# Patient Record
Sex: FEMALE | Race: WHITE | NOT HISPANIC OR LATINO | Employment: FULL TIME | URBAN - NONMETROPOLITAN AREA
[De-identification: names, ages, dates, MRNs, and addresses within clinical notes are randomized per-mention and may not be internally consistent; named-entity substitution may affect disease eponyms.]

---

## 2017-08-09 LAB — HIV 1&2 EXT: NORMAL

## 2018-03-04 ENCOUNTER — HOSPITAL ENCOUNTER (EMERGENCY)
Facility: HOSPITAL | Age: 38
Discharge: HOME | End: 2018-03-04
Attending: INTERNAL MEDICINE | Admitting: INTERNAL MEDICINE
Payer: COMMERCIAL

## 2018-03-04 VITALS
SYSTOLIC BLOOD PRESSURE: 121 MMHG | HEIGHT: 68 IN | OXYGEN SATURATION: 100 % | DIASTOLIC BLOOD PRESSURE: 81 MMHG | RESPIRATION RATE: 16 BRPM | TEMPERATURE: 98.5 F

## 2018-03-04 DIAGNOSIS — R53.83 FATIGUE, UNSPECIFIED TYPE: ICD-10-CM

## 2018-03-04 LAB
ALBUMIN SERPL-MCNC: 3.4 G/DL (ref 3.4–5)
ALBUMIN UR-MCNC: NEGATIVE MG/DL
ALP SERPL-CCNC: 178 U/L (ref 40–150)
ALT SERPL W P-5'-P-CCNC: 32 U/L (ref 0–50)
ANION GAP SERPL CALCULATED.3IONS-SCNC: 6 MMOL/L (ref 3–14)
APPEARANCE UR: CLEAR
AST SERPL W P-5'-P-CCNC: 31 U/L (ref 0–45)
BACTERIA #/AREA URNS HPF: ABNORMAL /HPF
BASOPHILS # BLD AUTO: 0.1 10E9/L (ref 0–0.2)
BASOPHILS NFR BLD AUTO: 0.5 %
BILIRUB SERPL-MCNC: 0.3 MG/DL (ref 0.2–1.3)
BILIRUB UR QL STRIP: NEGATIVE
BUN SERPL-MCNC: 15 MG/DL (ref 7–30)
CALCIUM SERPL-MCNC: 9.9 MG/DL (ref 8.5–10.1)
CHLORIDE SERPL-SCNC: 106 MMOL/L (ref 94–109)
CO2 SERPL-SCNC: 26 MMOL/L (ref 20–32)
COLOR UR AUTO: YELLOW
CREAT SERPL-MCNC: 0.71 MG/DL (ref 0.52–1.04)
DIFFERENTIAL METHOD BLD: ABNORMAL
EOSINOPHIL # BLD AUTO: 0.2 10E9/L (ref 0–0.7)
EOSINOPHIL NFR BLD AUTO: 2 %
ERYTHROCYTE [DISTWIDTH] IN BLOOD BY AUTOMATED COUNT: 15.9 % (ref 10–15)
GFR SERPL CREATININE-BSD FRML MDRD: >90 ML/MIN/1.7M2
GLUCOSE SERPL-MCNC: 89 MG/DL (ref 70–99)
GLUCOSE UR STRIP-MCNC: NEGATIVE MG/DL
HCT VFR BLD AUTO: 37.6 % (ref 35–47)
HGB BLD-MCNC: 11.9 G/DL (ref 11.7–15.7)
HGB UR QL STRIP: ABNORMAL
IMM GRANULOCYTES # BLD: 0.1 10E9/L (ref 0–0.4)
IMM GRANULOCYTES NFR BLD: 0.5 %
KETONES UR STRIP-MCNC: NEGATIVE MG/DL
LEUKOCYTE ESTERASE UR QL STRIP: ABNORMAL
LYMPHOCYTES # BLD AUTO: 3 10E9/L (ref 0.8–5.3)
LYMPHOCYTES NFR BLD AUTO: 25.1 %
MCH RBC QN AUTO: 26.1 PG (ref 26.5–33)
MCHC RBC AUTO-ENTMCNC: 31.6 G/DL (ref 31.5–36.5)
MCV RBC AUTO: 83 FL (ref 78–100)
MONOCYTES # BLD AUTO: 0.9 10E9/L (ref 0–1.3)
MONOCYTES NFR BLD AUTO: 7.2 %
MUCOUS THREADS #/AREA URNS LPF: PRESENT /LPF
NEUTROPHILS # BLD AUTO: 7.7 10E9/L (ref 1.6–8.3)
NEUTROPHILS NFR BLD AUTO: 64.7 %
NITRATE UR QL: NEGATIVE
NRBC # BLD AUTO: 0 10*3/UL
NRBC BLD AUTO-RTO: 0 /100
PH UR STRIP: 5.5 PH (ref 4.7–8)
PLATELET # BLD AUTO: 465 10E9/L (ref 150–450)
POTASSIUM SERPL-SCNC: 3.7 MMOL/L (ref 3.4–5.3)
PROT SERPL-MCNC: 8.4 G/DL (ref 6.8–8.8)
RBC # BLD AUTO: 4.56 10E12/L (ref 3.8–5.2)
RBC #/AREA URNS AUTO: 38 /HPF (ref 0–2)
SODIUM SERPL-SCNC: 138 MMOL/L (ref 133–144)
SOURCE: ABNORMAL
SP GR UR STRIP: 1.02 (ref 1–1.03)
UROBILINOGEN UR STRIP-MCNC: NORMAL MG/DL (ref 0–2)
WBC # BLD AUTO: 11.9 10E9/L (ref 4–11)
WBC #/AREA URNS AUTO: 6 /HPF (ref 0–5)

## 2018-03-04 PROCEDURE — 81001 URINALYSIS AUTO W/SCOPE: CPT | Performed by: INTERNAL MEDICINE

## 2018-03-04 PROCEDURE — 36415 COLL VENOUS BLD VENIPUNCTURE: CPT | Performed by: INTERNAL MEDICINE

## 2018-03-04 PROCEDURE — 85025 COMPLETE CBC W/AUTO DIFF WBC: CPT | Performed by: INTERNAL MEDICINE

## 2018-03-04 PROCEDURE — 99283 EMERGENCY DEPT VISIT LOW MDM: CPT | Performed by: INTERNAL MEDICINE

## 2018-03-04 PROCEDURE — 80053 COMPREHEN METABOLIC PANEL: CPT | Performed by: INTERNAL MEDICINE

## 2018-03-04 PROCEDURE — 99283 EMERGENCY DEPT VISIT LOW MDM: CPT

## 2018-03-04 ASSESSMENT — ENCOUNTER SYMPTOMS
HEMATURIA: 0
CHILLS: 0
ABDOMINAL PAIN: 0
VOMITING: 0
ABDOMINAL DISTENTION: 0
NAUSEA: 0
CHEST TIGHTNESS: 0
MYALGIAS: 0
WHEEZING: 0
NECK STIFFNESS: 0
PALPITATIONS: 0
DYSURIA: 0
DIAPHORESIS: 0
BACK PAIN: 0
COUGH: 0
BLOOD IN STOOL: 0
COLOR CHANGE: 0
VOICE CHANGE: 0
FLANK PAIN: 0
ARTHRALGIAS: 0
WOUND: 0
SHORTNESS OF BREATH: 0
NECK PAIN: 0
FEVER: 0
LIGHT-HEADEDNESS: 0
HEADACHES: 0
NUMBNESS: 0
DIZZINESS: 0
CONFUSION: 0
ANAL BLEEDING: 0

## 2018-03-04 NOTE — ED AVS SNAPSHOT
[facility]    [address]    [place] [address]    Phone:  [phone]                                       Savanna Scott   MRN: [id number]    Department:  [facility]   Date of Visit:  3/4/2018           Patient Information     Date Of Birth          1980        Your diagnoses for this visit were:     Fatigue, unspecified type        You were seen by Francisco Parker MD.      Follow-up Information     Schedule an appointment as soon as possible for a visit with Anahi Pace MD.    Specialty:  Family Practice    Contact information:    [facility]  [address]  [place] [address]  [phone]          Discharge Instructions         Symptoms With Uncertain Cause (Adult)  You have been examined, and tests may have been done. However, the exact cause of your symptoms is still not certain. Watch for any new symptoms or worsening of your condition. Another exam or more testing at a later time may be needed. Unless told otherwise, you can go back to your normal routine.  Follow-up care  Follow up with your healthcare provider if your symptoms do not begin to improve in the next few days, or as advised by our staff.   When to seek medical advice  Call your healthcare provider if your symptoms get worse or if new symptoms appear.  Date Last Reviewed: 6/26/2015 2000-2017 [employer]. [address] [place] [address], [place] [address]. All rights reserved. This information is not intended as a substitute for professional medical care. Always follow your healthcare professional's instructions.             Review of your medicines      Notice     You have not been prescribed any medications.            Procedures and tests performed during your visit     CBC with platelets differential    Comprehensive metabolic panel    UA with Microscopic reflex to Culture      Orders Needing Specimen Collection     None      Pending Results     No orders found from 3/2/2018  "to 3/5/2018.            Pending Culture Results     No orders found from 3/2/2018 to 3/5/2018.            Thank you for choosing [facility]       Thank you for choosing [facility] for your care. Our goal is always to provide you with excellent care. Hearing back from our patients is one way we can continue to improve our services. Please take a few minutes to complete the written survey that you may receive in the mail after you visit with us. Thank you!        [employer] Information     [employer] lets you send messages to your doctor, view your test results, renew your prescriptions, schedule appointments and more. To sign up, go to [email or web address] . Click on \"Log in\" on the left side of the screen, which will take you to the Welcome page. Then click on \"Sign up Now\" on the right side of the page.     You will be asked to enter the access code listed below, as well as some personal information. Please follow the directions to create your username and password.     Your access code is: [email or web address]  Expires: 2018  9:31 PM     Your access code will  in 90 days. If you need help or a new code, please call your [facility] clinic or [phone].        Care EveryWhere ID     This is your Care EveryWhere ID. This could be used by other organizations to access your [facility] medical records  [id number]        Equal Access to Services     BERT HAMMOND AH: Hadsimno Santo, waaxda eren, qaybta kaaldelmi hernandez. So [facility] [phone].    ATENCIÓN: Si habla español, tiene a blood disposición servicios gratuitos de asistencia lingüística. Bogdan al [phone].    We comply with applicable federal civil rights laws and [place] laws. We do not discriminate on the basis of race, color, national origin, age, disability, sex, sexual orientation, or gender identity.            After Visit Summary       This is your record. Keep this with you and show to your " community pharmacist(s) and doctor(s) at your next visit.

## 2018-03-04 NOTE — ED AVS SNAPSHOT
[facility]    [address]    [place] [address]    Phone:  [phone]                                       Savanna Scott   MRN: [id number]    Department:  [facility]   Date of Visit:  3/4/2018           After Visit Summary Signature Page     I have received my discharge instructions, and my questions have been answered. I have discussed any challenges I see with this plan with the nurse or doctor.    ..........................................................................................................................................  Patient/Patient Representative Signature      ..........................................................................................................................................  Patient Representative Print Name and Relationship to Patient    ..................................................               ................................................  Date                                            Time    ..........................................................................................................................................  Reviewed by Signature/Title    ...................................................              ..............................................  Date                                                            Time

## 2018-03-05 NOTE — ED PROVIDER NOTES
"  History     Chief Complaint   Patient presents with     Generalized Weakness     Pt stated she had an uncomplicated vaginal delivery. States her hgb 9.8 at discharge. States she feels very tired \"more tired than I think I should\". Denies any fevers.     The history is provided by the patient.     Savanna Scott is a 37 year old female who came for feeling fatigue today. Denies any other symptoms althought had some slight change in her voice.     Problem List:    There are no active problems to display for this patient.       Past Medical History:    History reviewed. No pertinent past medical history.    Past Surgical History:    History reviewed. No pertinent surgical history.    Family History:    No family history on file.    Social History:  Marital Status:   [2]  Social History   Substance Use Topics     Smoking status: Never Smoker     Smokeless tobacco: Never Used     Alcohol use Not on file        Medications:      No current outpatient prescriptions on file.      Review of Systems   Constitutional: Negative for chills, diaphoresis and fever.   HENT: Negative for voice change.    Eyes: Negative for visual disturbance.   Respiratory: Negative for cough, chest tightness, shortness of breath and wheezing.    Cardiovascular: Negative for chest pain, palpitations and leg swelling.   Gastrointestinal: Negative for abdominal distention, abdominal pain, anal bleeding, blood in stool, nausea and vomiting.   Genitourinary: Negative for decreased urine volume, dysuria, flank pain and hematuria.   Musculoskeletal: Negative for arthralgias, back pain, gait problem, myalgias, neck pain and neck stiffness.   Skin: Negative for color change, pallor, rash and wound.   Neurological: Negative for dizziness, syncope, light-headedness, numbness and headaches.   Psychiatric/Behavioral: Negative for confusion and suicidal ideas.       Physical Exam   BP: 131/68  Heart Rate: 79  Temp: 97.6  F (36.4  C)  Resp: " "16  Height: 172.7 cm (5' 8\")  SpO2: 100 %      Physical Exam   Constitutional: She is oriented to person, place, and time. She appears well-developed and well-nourished.   HENT:   Head: Normocephalic and atraumatic.   Mouth/Throat: No oropharyngeal exudate.   Eyes: Conjunctivae are normal. Pupils are equal, round, and reactive to light.   Neck: Normal range of motion. Neck supple. No JVD present. No tracheal deviation present. No thyromegaly present.   Cardiovascular: Normal rate, regular rhythm, normal heart sounds and intact distal pulses.  Exam reveals no gallop and no friction rub.    No murmur heard.  Pulmonary/Chest: Effort normal and breath sounds normal. No stridor. No respiratory distress. She has no wheezes. She has no rales. She exhibits no tenderness.   Abdominal: Soft. Bowel sounds are normal. She exhibits no distension and no mass. There is no tenderness. There is no rebound and no guarding.   Musculoskeletal: Normal range of motion. She exhibits no edema or tenderness.   Lymphadenopathy:     She has no cervical adenopathy.   Neurological: She is alert and oriented to person, place, and time.   Skin: Skin is warm and dry. No rash noted. No erythema. No pallor.   Psychiatric: Her behavior is normal.   Nursing note and vitals reviewed.      ED Course     ED Course     Procedures                   Labs Ordered and Resulted from Time of ED Arrival Up to the Time of Departure from the ED   ROUTINE UA WITH MICROSCOPIC REFLEX TO CULTURE - Abnormal; Notable for the following:        Result Value    Blood Urine Large (*)     Leukocyte Esterase Urine Small (*)     WBC Urine 6 (*)     RBC Urine 38 (*)     Bacteria Urine Few (*)     Mucous Urine Present (*)     All other components within normal limits   CBC WITH PLATELETS DIFFERENTIAL - Abnormal; Notable for the following:     WBC 11.9 (*)     MCH 26.1 (*)     RDW 15.9 (*)     Platelet Count 465 (*)     All other components within normal limits   COMPREHENSIVE " METABOLIC PANEL - Abnormal; Notable for the following:     Alkaline Phosphatase 178 (*)     All other components within normal limits       Assessments & Plan (with Medical Decision Making)   Post partum, feeling fatigue that started today  Exam normal  Labs: slight elevated WBC   UA: likely contaminated with vag discharge  Denies any urinary symptoms  Uncertain etiology, can be early viral disease  Dc home, fu with PCP  I have reviewed the nursing notes.    I have reviewed the findings, diagnosis, plan and need for follow up with the patient.      There are no discharge medications for this patient.      Final diagnoses:   Fatigue, unspecified type       3/4/2018   [facility]     Francisco Parker MD  03/04/18 [id number]

## 2018-03-05 NOTE — DISCHARGE INSTRUCTIONS
Symptoms With Uncertain Cause (Adult)  You have been examined, and tests may have been done. However, the exact cause of your symptoms is still not certain. Watch for any new symptoms or worsening of your condition. Another exam or more testing at a later time may be needed. Unless told otherwise, you can go back to your normal routine.  Follow-up care  Follow up with your healthcare provider if your symptoms do not begin to improve in the next few days, or as advised by our staff.   When to seek medical advice  Call your healthcare provider if your symptoms get worse or if new symptoms appear.  Date Last Reviewed: 6/26/2015 2000-2017 [employer]. [address] [place] [address], [place] [address]. All rights reserved. This information is not intended as a substitute for professional medical care. Always follow your healthcare professional's instructions.

## 2018-03-05 NOTE — ED NOTES
"In ED for \"feeling run down and weak, just gave birth(vaginal) 6 days ago to a heathly boy, induced 1 week early due to gestational diabetes. Hemoglogin was low at 9.8 upon discharge from [facility] in [place], [place] this past Wed.  Traveled up here yesterday to visit family.   "

## 2020-06-05 LAB — HEP C HIM: NORMAL

## 2020-08-07 ENCOUNTER — NURSE TRIAGE (OUTPATIENT)
Dept: NURSING | Facility: CLINIC | Age: 40
End: 2020-08-07
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
## 2021-07-14 LAB — HPV ABSTRACT: NORMAL

## 2021-07-21 LAB
CHOLESTEROL (EXTERNAL): 189 MG/DL
HDLC SERPL-MCNC: 78 MG/DL
LDL CHOLESTEROL CALCULATED (EXTERNAL): 95 MG/DL
TRIGLYCERIDES (EXTERNAL): 80 MG/DL

## 2022-07-18 LAB
GLUCOSE (EXTERNAL): 98 MG/DL (ref 70–100)
HBA1C MFR BLD: 5 %
POTASSIUM (EXTERNAL): 3.8 MMOL/L (ref 3.5–5.1)
TSH SERPL-ACNC: 0.64 UIU/ML (ref 0.3–4.7)

## 2022-12-29 ENCOUNTER — TRANSFERRED RECORDS (OUTPATIENT)
Dept: MULTI SPECIALTY CLINIC | Facility: CLINIC | Age: 42
End: 2022-12-29

## 2022-12-29 LAB
ALT SERPL-CCNC: 15 U/L (ref 8–45)
AST SERPL-CCNC: 19 U/L (ref 5–41)
CREATININE (EXTERNAL): 0.77 MG/DL (ref 0.57–1.11)
GFR ESTIMATED (EXTERNAL): >60 ML/MIN/1.73M2

## 2023-05-19 ENCOUNTER — OFFICE VISIT (OUTPATIENT)
Dept: FAMILY MEDICINE | Facility: OTHER | Age: 43
End: 2023-05-19
Attending: FAMILY MEDICINE
Payer: COMMERCIAL

## 2023-05-19 VITALS
RESPIRATION RATE: 16 BRPM | TEMPERATURE: 97.7 F | HEART RATE: 75 BPM | HEIGHT: 68 IN | BODY MASS INDEX: 23.07 KG/M2 | DIASTOLIC BLOOD PRESSURE: 62 MMHG | OXYGEN SATURATION: 98 % | WEIGHT: 152.2 LBS | SYSTOLIC BLOOD PRESSURE: 104 MMHG

## 2023-05-19 DIAGNOSIS — F33.1 MODERATE EPISODE OF RECURRENT MAJOR DEPRESSIVE DISORDER (H): ICD-10-CM

## 2023-05-19 DIAGNOSIS — M25.50 ARTHRALGIA, UNSPECIFIED JOINT: ICD-10-CM

## 2023-05-19 DIAGNOSIS — Z00.00 HEALTH CARE MAINTENANCE: Primary | ICD-10-CM

## 2023-05-19 PROBLEM — R53.83 FATIGUE: Status: ACTIVE | Noted: 2023-05-19

## 2023-05-19 PROBLEM — H52.13 MYOPIA OF BOTH EYES WITH REGULAR ASTIGMATISM: Status: ACTIVE | Noted: 2021-03-04

## 2023-05-19 PROBLEM — F41.9 ANXIETY: Status: ACTIVE | Noted: 2020-06-24

## 2023-05-19 PROBLEM — H43.393 VITREOUS FLOATERS OF BOTH EYES: Status: ACTIVE | Noted: 2021-03-04

## 2023-05-19 PROBLEM — J30.1 SEASONAL ALLERGIC RHINITIS DUE TO POLLEN: Status: ACTIVE | Noted: 2019-05-29

## 2023-05-19 PROBLEM — H52.223 MYOPIA OF BOTH EYES WITH REGULAR ASTIGMATISM: Status: ACTIVE | Noted: 2021-03-04

## 2023-05-19 PROBLEM — H04.123 DRY EYE SYNDROME OF BOTH EYES: Status: ACTIVE | Noted: 2021-03-04

## 2023-05-19 PROBLEM — G43.109 CLASSIC MIGRAINE WITH AURA: Status: ACTIVE | Noted: 2020-01-14

## 2023-05-19 PROBLEM — K21.9 GASTROESOPHAGEAL REFLUX DISEASE WITHOUT ESOPHAGITIS: Status: ACTIVE | Noted: 2020-01-14

## 2023-05-19 PROCEDURE — 99386 PREV VISIT NEW AGE 40-64: CPT | Performed by: FAMILY MEDICINE

## 2023-05-19 RX ORDER — NAPROXEN SODIUM 220 MG
TABLET ORAL
COMMUNITY

## 2023-05-19 RX ORDER — VENLAFAXINE HYDROCHLORIDE 75 MG/1
75 CAPSULE, EXTENDED RELEASE ORAL DAILY
COMMUNITY
Start: 2023-04-03 | End: 2023-07-24

## 2023-05-19 ASSESSMENT — ENCOUNTER SYMPTOMS
HEADACHES: 0
CONSTIPATION: 0
SORE THROAT: 0
HEMATOCHEZIA: 0
SHORTNESS OF BREATH: 0
PALPITATIONS: 0
COUGH: 0
FREQUENCY: 0
DIARRHEA: 0
MYALGIAS: 0
ARTHRALGIAS: 0
DIZZINESS: 0
HEARTBURN: 0
DYSURIA: 0
PARESTHESIAS: 0
WEAKNESS: 0
HEMATURIA: 0
NERVOUS/ANXIOUS: 0
ABDOMINAL PAIN: 0
FEVER: 0
JOINT SWELLING: 0
CHILLS: 0
EYE PAIN: 0
NAUSEA: 0

## 2023-05-19 ASSESSMENT — ANXIETY QUESTIONNAIRES
2. NOT BEING ABLE TO STOP OR CONTROL WORRYING: NOT AT ALL
IF YOU CHECKED OFF ANY PROBLEMS ON THIS QUESTIONNAIRE, HOW DIFFICULT HAVE THESE PROBLEMS MADE IT FOR YOU TO DO YOUR WORK, TAKE CARE OF THINGS AT HOME, OR GET ALONG WITH OTHER PEOPLE: NOT DIFFICULT AT ALL
3. WORRYING TOO MUCH ABOUT DIFFERENT THINGS: SEVERAL DAYS
5. BEING SO RESTLESS THAT IT IS HARD TO SIT STILL: NOT AT ALL
4. TROUBLE RELAXING: NOT AT ALL
GAD7 TOTAL SCORE: 2
1. FEELING NERVOUS, ANXIOUS, OR ON EDGE: SEVERAL DAYS
7. FEELING AFRAID AS IF SOMETHING AWFUL MIGHT HAPPEN: NOT AT ALL
7. FEELING AFRAID AS IF SOMETHING AWFUL MIGHT HAPPEN: NOT AT ALL
6. BECOMING EASILY ANNOYED OR IRRITABLE: NOT AT ALL
8. IF YOU CHECKED OFF ANY PROBLEMS, HOW DIFFICULT HAVE THESE MADE IT FOR YOU TO DO YOUR WORK, TAKE CARE OF THINGS AT HOME, OR GET ALONG WITH OTHER PEOPLE?: NOT DIFFICULT AT ALL
GAD7 TOTAL SCORE: 2

## 2023-05-19 ASSESSMENT — PAIN SCALES - GENERAL: PAINLEVEL: NO PAIN (0)

## 2023-05-19 NOTE — PROGRESS NOTES
Assessment & Plan     No diagnosis found.         No follow-ups on file.    Lisa Reis MD  [facility] AND [facility]    Delroy Corralse is a 42 year old, presenting for the following health issues:  Physical (Yearly physical, establish care)        5/19/2023    10:03 AM   Additional Questions   Roomed by Gabriela   Accompanied by self     Healthy Habits:     Getting at least 3 servings of Calcium per day:  Yes    Bi-annual eye exam:  Yes    Dental care twice a year:  Yes    Sleep apnea or symptoms of sleep apnea:  None    Diet:  Regular (no restrictions)    Frequency of exercise:  1 day/week    Duration of exercise:  Less than 15 minutes    Taking medications regularly:  Yes    Medication side effects:  Other    PHQ-2 Total Score: 0    Additional concerns today:  No       Contraception: vasectomy  Risk for STI?: no  Last pap: 2021 with neg HPV  Any hx of abnormal paps:  no  FH of early CA?: no  Tobacco?: no  Calcium intake: yes  DEXA:  not indicated  Last mammo: no previous  Colonoscopy:  not indicated   Immunizations: up to date        Review of Systems   Constitutional: Negative for chills and fever.   HENT: Negative for congestion, ear pain, hearing loss and sore throat.    Eyes: Negative for pain and visual disturbance.   Respiratory: Negative for cough and shortness of breath.    Cardiovascular: Negative for chest pain, palpitations and peripheral edema.   Gastrointestinal: Negative for abdominal pain, constipation, diarrhea, heartburn, hematochezia and nausea.   Genitourinary: Negative for dysuria, frequency, genital sores, hematuria, pelvic pain, urgency and vaginal bleeding.   Musculoskeletal: Negative for arthralgias, joint swelling and myalgias.   Skin: Negative for rash.   Neurological: Negative for dizziness, weakness, headaches and paresthesias.   Psychiatric/Behavioral: Negative for mood changes. The patient is not nervous/anxious.                 Objective    /62 (BP Location:  "Right arm, Patient Position: Sitting, Cuff Size: Adult Regular)   Pulse 75   Temp 97.7  F (36.5  C) (Tympanic)   Resp 16   Ht 1.727 m (5' 8\")   Wt 69 kg (152 lb 3.2 oz)   LMP 05/14/2023 (Exact Date)   SpO2 98%   BMI 23.14 kg/m    Body mass index is 23.14 kg/m .  Physical Exam                       Answers for HPI/ROS submitted by the patient on 5/19/2023  KEVIN 7 TOTAL SCORE: 2      "

## 2023-05-19 NOTE — PROGRESS NOTES
Assessment & Plan       ICD-10-CM    1. Health care maintenance  Z00.00 MA Screen Bilateral w/[facility]      2. Moderate episode of recurrent major depressive disorder (H)  F33.1       3. Arthralgia, unspecified joint  M25.50         Reviewed healthcare maintenance recommendations.  Patient will proceed with mammogram for breast cancer screening.  Pap smear is up-to-date, next due in 2026.  Reviewed previous labs, none plan for repeat at this time.  Patient is encouraged to keep an appointment with her current rheumatologist as this will likely be easier than establishing with someone new.  Follow-up with concerns.         No follow-ups on file.    Lisa Reis MD  [facility]   Savanna is a 42 year old, presenting for the following health issues:  Physical (Yearly physical, establish care)        5/19/2023    10:03 AM   Additional Questions   Roomed by Gabriela   Accompanied by self     Healthy Habits:     Getting at least 3 servings of Calcium per day:  Yes    Bi-annual eye exam:  Yes    Dental care twice a year:  Yes    Sleep apnea or symptoms of sleep apnea:  None    Diet:  Regular (no restrictions)    Frequency of exercise:  1 day/week    Duration of exercise:  Less than 15 minutes    Taking medications regularly:  Yes    Medication side effects:  Other    PHQ-2 Total Score: 0    Additional concerns today:  No     Contraception: vasectomy  Risk for STI?: no  Last pap: 2021 with neg HPV  Any hx of abnormal paps:  no  FH of early CA?: no  Tobacco?: no  Calcium intake: yes  DEXA:  not indicated  Last mammo: no previous  Colonoscopy:  not indicated   Immunizations: up to date      Past medical history, past surgical history, family history, social history all reviewed and updated in the chart.    Review of Systems   Constitutional: Negative for chills and fever.   HENT: Negative for congestion, ear pain, hearing loss and sore throat.    Eyes: Negative for pain and visual disturbance.  "  Respiratory: Negative for cough and shortness of breath.    Cardiovascular: Negative for chest pain, palpitations and peripheral edema.   Gastrointestinal: Negative for abdominal pain, constipation, diarrhea, heartburn, hematochezia and nausea.   Genitourinary: Negative for dysuria, frequency, genital sores, hematuria, pelvic pain, urgency and vaginal bleeding.   Musculoskeletal: Negative for arthralgias, joint swelling and myalgias.   Skin: Negative for rash.   Neurological: Negative for dizziness, weakness, headaches and paresthesias.   Psychiatric/Behavioral: Negative for mood changes. The patient is not nervous/anxious.             Objective    /62 (BP Location: Right arm, Patient Position: Sitting, Cuff Size: Adult Regular)   Pulse 75   Temp 97.7  F (36.5  C) (Tympanic)   Resp 16   Ht 1.727 m (5' 8\")   Wt 69 kg (152 lb 3.2 oz)   LMP 05/14/2023 (Exact Date)   SpO2 98%   BMI 23.14 kg/m    Body mass index is 23.14 kg/m .  Physical Exam  Constitutional:       Appearance: She is well-developed.   Eyes:      Conjunctiva/sclera: Conjunctivae normal.   Neck:      Thyroid: No thyromegaly.   Cardiovascular:      Rate and Rhythm: Normal rate and regular rhythm.      Heart sounds: Normal heart sounds. No murmur heard.  Pulmonary:      Effort: Pulmonary effort is normal. No respiratory distress.      Breath sounds: Normal breath sounds.   Abdominal:      Palpations: Abdomen is soft.   Lymphadenopathy:      Cervical: No cervical adenopathy.   Skin:     Findings: No rash.   Neurological:      Mental Status: She is alert and oriented to person, place, and time.                         "

## 2023-05-19 NOTE — NURSING NOTE
"Chief Complaint   Patient presents with     Physical     Yearly physical, establish care       Initial /62 (BP Location: Right arm, Patient Position: Sitting, Cuff Size: Adult Regular)   Pulse 75   Temp 97.7  F (36.5  C) (Tympanic)   Resp 16   Ht 1.727 m (5' 8\")   Wt 69 kg (152 lb 3.2 oz)   LMP 05/14/2023 (Exact Date)   SpO2 98%   BMI 23.14 kg/m   Estimated body mass index is 23.14 kg/m  as calculated from the following:    Height as of this encounter: 1.727 m (5' 8\").    Weight as of this encounter: 69 kg (152 lb 3.2 oz).  Medication Reconciliation: complete    Gabriela Martinez LPN    Advance Care Directive reviewed    "

## 2023-07-23 ENCOUNTER — MYC MEDICAL ADVICE (OUTPATIENT)
Dept: FAMILY MEDICINE | Facility: OTHER | Age: 43
End: 2023-07-23
Payer: COMMERCIAL

## 2023-07-23 DIAGNOSIS — F33.1 MODERATE EPISODE OF RECURRENT MAJOR DEPRESSIVE DISORDER (H): Primary | ICD-10-CM

## 2023-07-24 RX ORDER — VENLAFAXINE HYDROCHLORIDE 75 MG/1
75 CAPSULE, EXTENDED RELEASE ORAL DAILY
Qty: 90 CAPSULE | Refills: 3 | Status: SHIPPED | OUTPATIENT
Start: 2023-07-24 | End: 2024-06-21

## 2023-10-06 ENCOUNTER — HOSPITAL ENCOUNTER (OUTPATIENT)
Dept: MAMMOGRAPHY | Facility: OTHER | Age: 43
Discharge: HOME | End: 2023-10-06
Attending: FAMILY MEDICINE | Admitting: FAMILY MEDICINE
Payer: COMMERCIAL

## 2023-10-06 DIAGNOSIS — Z00.00 HEALTH CARE MAINTENANCE: ICD-10-CM

## 2023-10-06 DIAGNOSIS — Z12.31 VISIT FOR SCREENING MAMMOGRAM: ICD-10-CM

## 2023-10-06 PROCEDURE — 77067 SCR MAMMO BI INCL CAD: CPT

## 2023-11-11 ENCOUNTER — HOSPITAL ENCOUNTER (EMERGENCY)
Facility: HOSPITAL | Age: 43
Discharge: HOME | End: 2023-11-11
Attending: PHYSICIAN ASSISTANT | Admitting: PHYSICIAN ASSISTANT
Payer: COMMERCIAL

## 2023-11-11 VITALS
TEMPERATURE: 96.6 F | HEIGHT: 68 IN | OXYGEN SATURATION: 97 % | DIASTOLIC BLOOD PRESSURE: 73 MMHG | RESPIRATION RATE: 18 BRPM | SYSTOLIC BLOOD PRESSURE: 112 MMHG | HEART RATE: 98 BPM | BODY MASS INDEX: 23.49 KG/M2 | WEIGHT: 155 LBS

## 2023-11-11 DIAGNOSIS — H66.92 LEFT ACUTE OTITIS MEDIA: ICD-10-CM

## 2023-11-11 PROCEDURE — G0463 HOSPITAL OUTPT CLINIC VISIT: HCPCS

## 2023-11-11 PROCEDURE — 99213 OFFICE O/P EST LOW 20 MIN: CPT | Performed by: PHYSICIAN ASSISTANT

## 2023-11-11 RX ORDER — PREDNISONE 20 MG/1
TABLET ORAL
Qty: 10 TABLET | Refills: 0 | Status: SHIPPED | OUTPATIENT
Start: 2023-11-11 | End: 2024-07-16

## 2023-11-11 RX ORDER — CEFDINIR 300 MG/1
300 CAPSULE ORAL 2 TIMES DAILY
Qty: 20 CAPSULE | Refills: 0 | Status: SHIPPED | OUTPATIENT
Start: 2023-11-11 | End: 2023-11-21

## 2023-11-11 NOTE — ED TRIAGE NOTES
Patient came to urgent care due to  left ear pain, started last night. Patient was holding her nose to clear her ears out but then her ears started hurting all the way to glands bilateral. Patient sated when she talks her left ear makes an echo sound like a keke.

## 2023-11-11 NOTE — DISCHARGE INSTRUCTIONS
Take the Cefdinir as prescribed for your ear infection.   Take the prednisone as prescribed to help with the pain and inflammation given your planned flight this week.   Alternate between Ibuprofen and Tylenol every 4 hours for fever/pain control.  Increase fluids and rest.  Use a humidifier at night.  Return here with any difficulty breathing or new/concerning symptoms.

## 2023-11-11 NOTE — ED PROVIDER NOTES
History     Chief Complaint   Patient presents with    Ear Fullness     HPI  Savanna Scott is a 43 year old female who is covid positive with URI symptoms beginning 1 week ago who presents today with severe right ear pain, fullness, and decreased hearing. H/o frequent sinus infections. She has a work flight planned this Wednesday and is requesting prednisone to help with relieving the congestion in her ear, as it has worked well in the past for her sinus infections.     Allergies:  Allergies   Allergen Reactions    Insulin Aspart Rash     Allergy to suspension not medication    Sulfa Antibiotics Rash     RASH    Amoxicillin-Pot Clavulanate Diarrhea    Insulin Lispro Unknown     Allergy to the suspension not the medication       Problem List:    Patient Active Problem List    Diagnosis Date Noted    Fatigue 05/19/2023     Priority: Medium    Dry eye syndrome of both eyes 03/04/2021     Priority: Medium    Myopia of both eyes with regular astigmatism 03/04/2021     Priority: Medium    Vitreous floaters of both eyes 03/04/2021     Priority: Medium    Anxiety 06/24/2020     Priority: Medium    Classic migraine with aura 01/14/2020     Priority: Medium    Gastroesophageal reflux disease without esophagitis 01/14/2020     Priority: Medium    Seasonal allergic rhinitis due to pollen 05/29/2019     Priority: Medium    Moderate episode of recurrent major depressive disorder (H) 04/29/2019     Priority: Medium    Bee sting allergy 07/15/2014     Priority: Medium        Past Medical History:    Past Medical History:   Diagnosis Date    Anxiety     Classic migraine with aura     Elevated rheumatoid factor 10/2015    Fatigue     Gastroesophageal reflux disease with esophagitis     Gestational diabetes mellitus, antepartum 02/23/2018    Hypermobility of joint     Seasonal allergic rhinitis     Vitamin D deficiency 08/19/2014    Vocal cord paralysis 05/25/2018       Past Surgical History:    Past Surgical History:  "  Procedure Laterality Date    COLONOSCOPY  01/05/2010    EGD  02/15/2010    with pill cam/patency placement    LAPAROSCOPY DIAGNOSTIC (GENERAL)      UPPER GI ENDOSCOPY  05/11/2021    with biospy, throat    wisdom teeth extraction         Family History:    Family History   Problem Relation Age of Onset    Osteoporosis Mother     Other - See Comments Mother         Familial hypocalcuric hypercalcemia    Hypothyroidism Mother     Rheumatic fever Father     No Known Problems Brother     Heart Disease Maternal Grandmother     Glaucoma Maternal Grandfather     Prostate Cancer Maternal Grandfather     Lung Cancer Paternal Grandmother     Cancer Paternal Grandfather         Bladder    Diabetes Paternal Grandfather     Glaucoma Paternal Grandfather     Cervical Cancer Maternal Aunt     Rheumatoid Arthritis Maternal Aunt     No Known Problems Son        Social History:  Marital Status:   [2]  Social History     Tobacco Use    Smoking status: Never    Smokeless tobacco: Never   Vaping Use    Vaping Use: Never used        Medications:    cefdinir (OMNICEF) 300 MG capsule  cholecalciferol 50 MCG (2000 UT) CAPS  Multiple Vitamin (MULTIVITAMIN ADULT PO)  naproxen sodium (ANAPROX) 220 MG tablet  predniSONE (DELTASONE) 20 MG tablet  venlafaxine (EFFEXOR XR) 75 MG 24 hr capsule          Review of Systems   All other systems reviewed and are negative.      Physical Exam   BP: 112/73  Pulse: 98  Temp: (!) 96.6  F (35.9  C)  Resp: 18  Height: 172.7 cm (5' 8\")  Weight: 70.3 kg (155 lb)  SpO2: 97 %      Physical Exam  Vitals and nursing note reviewed.   Constitutional:       General: She is not in acute distress.     Appearance: She is well-developed. She is not diaphoretic.   HENT:      Head: Normocephalic and atraumatic.      Right Ear: Ear canal and external ear normal. Decreased hearing noted. No mastoid tenderness. Tympanic membrane is erythematous and bulging.      Left Ear: Hearing, tympanic membrane, ear canal and " external ear normal. No mastoid tenderness.      Nose: Nose normal.      Mouth/Throat:      Pharynx: No oropharyngeal exudate.   Eyes:      General: No scleral icterus.        Right eye: No discharge.         Left eye: No discharge.      Conjunctiva/sclera: Conjunctivae normal.      Pupils: Pupils are equal, round, and reactive to light.   Cardiovascular:      Rate and Rhythm: Normal rate and regular rhythm.      Heart sounds: Normal heart sounds. No murmur heard.     No friction rub. No gallop.   Pulmonary:      Effort: Pulmonary effort is normal. No respiratory distress.      Breath sounds: Normal breath sounds. No wheezing or rales.   Chest:      Chest wall: No tenderness.   Abdominal:      General: Bowel sounds are normal.      Palpations: Abdomen is soft.      Tenderness: There is no abdominal tenderness.   Musculoskeletal:      Cervical back: Normal range of motion and neck supple.   Lymphadenopathy:      Cervical: No cervical adenopathy.   Skin:     General: Skin is warm and dry.      Coloration: Skin is not pale.      Findings: No erythema or rash.   Neurological:      Mental Status: She is alert and oriented to person, place, and time.      Cranial Nerves: No cranial nerve deficit.      Coordination: Coordination normal.   Psychiatric:         Behavior: Behavior normal.         Thought Content: Thought content normal.         Judgment: Judgment normal.         ED Course                 Procedures           No results found for this or any previous visit (from the past 24 hour(s)).    Medications - No data to display    Assessments & Plan (with Medical Decision Making)   Pt with bulging, erythematous, right TM, consistent with AOM. RX for Cefdinir was provided and prednisone per pt request given upcoming flight in 5 days. She was discharged home following in stable condition.     Plan: Take the Cefdinir as prescribed for your ear infection.   Take the prednisone as prescribed to help with the pain and  inflammation given your planned flight this week.   Alternate between Ibuprofen and Tylenol every 4 hours for fever/pain control.  Increase fluids and rest.  Use a humidifier at night.  Return here with any difficulty breathing or new/concerning symptoms.         I have reviewed the nursing notes.    I have reviewed the findings, diagnosis, plan and need for follow up with the patient.      New Prescriptions    CEFDINIR (OMNICEF) 300 MG CAPSULE    Take 1 capsule (300 mg) by mouth 2 times daily for 10 days    PREDNISONE (DELTASONE) 20 MG TABLET    Take two tablets (= 40mg) each day for 5 (five) days       Final diagnoses:   Left acute otitis media       11/11/2023   [facility]

## 2023-11-21 ENCOUNTER — MYC MEDICAL ADVICE (OUTPATIENT)
Dept: FAMILY MEDICINE | Facility: OTHER | Age: 43
End: 2023-11-21
Payer: COMMERCIAL

## 2023-11-21 DIAGNOSIS — R19.7 DIARRHEA: Primary | ICD-10-CM

## 2024-04-29 ENCOUNTER — MYC MEDICAL ADVICE (OUTPATIENT)
Dept: FAMILY MEDICINE | Facility: OTHER | Age: 44
End: 2024-04-29
Payer: COMMERCIAL

## 2024-06-18 SDOH — HEALTH STABILITY: PHYSICAL HEALTH: ON AVERAGE, HOW MANY DAYS PER WEEK DO YOU ENGAGE IN MODERATE TO STRENUOUS EXERCISE (LIKE A BRISK WALK)?: 5 DAYS

## 2024-06-18 SDOH — HEALTH STABILITY: PHYSICAL HEALTH: ON AVERAGE, HOW MANY MINUTES DO YOU ENGAGE IN EXERCISE AT THIS LEVEL?: 20 MIN

## 2024-06-18 ASSESSMENT — ANXIETY QUESTIONNAIRES
7. FEELING AFRAID AS IF SOMETHING AWFUL MIGHT HAPPEN: NOT AT ALL
7. FEELING AFRAID AS IF SOMETHING AWFUL MIGHT HAPPEN: NOT AT ALL
2. NOT BEING ABLE TO STOP OR CONTROL WORRYING: NOT AT ALL
4. TROUBLE RELAXING: NOT AT ALL
1. FEELING NERVOUS, ANXIOUS, OR ON EDGE: SEVERAL DAYS
GAD7 TOTAL SCORE: 3
5. BEING SO RESTLESS THAT IT IS HARD TO SIT STILL: NOT AT ALL
3. WORRYING TOO MUCH ABOUT DIFFERENT THINGS: SEVERAL DAYS
IF YOU CHECKED OFF ANY PROBLEMS ON THIS QUESTIONNAIRE, HOW DIFFICULT HAVE THESE PROBLEMS MADE IT FOR YOU TO DO YOUR WORK, TAKE CARE OF THINGS AT HOME, OR GET ALONG WITH OTHER PEOPLE: SOMEWHAT DIFFICULT
8. IF YOU CHECKED OFF ANY PROBLEMS, HOW DIFFICULT HAVE THESE MADE IT FOR YOU TO DO YOUR WORK, TAKE CARE OF THINGS AT HOME, OR GET ALONG WITH OTHER PEOPLE?: SOMEWHAT DIFFICULT
6. BECOMING EASILY ANNOYED OR IRRITABLE: SEVERAL DAYS
GAD7 TOTAL SCORE: 3

## 2024-06-18 ASSESSMENT — SOCIAL DETERMINANTS OF HEALTH (SDOH): HOW OFTEN DO YOU GET TOGETHER WITH FRIENDS OR RELATIVES?: TWICE A WEEK

## 2024-06-21 ENCOUNTER — OFFICE VISIT (OUTPATIENT)
Dept: FAMILY MEDICINE | Facility: OTHER | Age: 44
End: 2024-06-21
Attending: FAMILY MEDICINE
Payer: COMMERCIAL

## 2024-06-21 VITALS
OXYGEN SATURATION: 100 % | DIASTOLIC BLOOD PRESSURE: 72 MMHG | HEIGHT: 68 IN | SYSTOLIC BLOOD PRESSURE: 112 MMHG | WEIGHT: 156.8 LBS | TEMPERATURE: 98.1 F | HEART RATE: 75 BPM | BODY MASS INDEX: 23.76 KG/M2 | RESPIRATION RATE: 16 BRPM

## 2024-06-21 DIAGNOSIS — R20.0 LEFT ARM NUMBNESS: ICD-10-CM

## 2024-06-21 DIAGNOSIS — R20.0 NUMBNESS AND TINGLING OF BOTH FEET: ICD-10-CM

## 2024-06-21 DIAGNOSIS — Z00.00 HEALTH CARE MAINTENANCE: Primary | ICD-10-CM

## 2024-06-21 DIAGNOSIS — F33.1 MODERATE EPISODE OF RECURRENT MAJOR DEPRESSIVE DISORDER (H): ICD-10-CM

## 2024-06-21 DIAGNOSIS — R20.0 BILATERAL HAND NUMBNESS: ICD-10-CM

## 2024-06-21 DIAGNOSIS — R29.898 THUMB WEAKNESS: ICD-10-CM

## 2024-06-21 DIAGNOSIS — R20.2 NUMBNESS AND TINGLING OF BOTH FEET: ICD-10-CM

## 2024-06-21 DIAGNOSIS — G43.809 VESTIBULAR MIGRAINE: ICD-10-CM

## 2024-06-21 PROCEDURE — 99214 OFFICE O/P EST MOD 30 MIN: CPT | Mod: 25 | Performed by: FAMILY MEDICINE

## 2024-06-21 PROCEDURE — 99396 PREV VISIT EST AGE 40-64: CPT | Performed by: FAMILY MEDICINE

## 2024-06-21 RX ORDER — VENLAFAXINE HYDROCHLORIDE 75 MG/1
75 CAPSULE, EXTENDED RELEASE ORAL DAILY
Qty: 90 CAPSULE | Refills: 3 | Status: SHIPPED | OUTPATIENT
Start: 2024-06-21

## 2024-06-21 ASSESSMENT — PATIENT HEALTH QUESTIONNAIRE - PHQ9
SUM OF ALL RESPONSES TO PHQ QUESTIONS 1-9: 3
SUM OF ALL RESPONSES TO PHQ QUESTIONS 1-9: 3
10. IF YOU CHECKED OFF ANY PROBLEMS, HOW DIFFICULT HAVE THESE PROBLEMS MADE IT FOR YOU TO DO YOUR WORK, TAKE CARE OF THINGS AT HOME, OR GET ALONG WITH OTHER PEOPLE: NOT DIFFICULT AT ALL

## 2024-06-21 ASSESSMENT — PAIN SCALES - GENERAL: PAINLEVEL: MILD PAIN (3)

## 2024-06-21 NOTE — NURSING NOTE
"Chief Complaint   Patient presents with    Physical     Yearly physical along with the following concerns: Lower extremity edema, migraine headache, and left arm numbness and weakness       Initial /72 (BP Location: Right arm, Patient Position: Sitting, Cuff Size: Adult Regular)   Pulse 75   Temp 98.1  F (36.7  C) (Tympanic)   Resp 16   Ht 1.727 m (5' 8\")   Wt 71.1 kg (156 lb 12.8 oz)   LMP 06/04/2024 (Exact Date)   SpO2 100%   BMI 23.84 kg/m   Estimated body mass index is 23.84 kg/m  as calculated from the following:    Height as of this encounter: 1.727 m (5' 8\").    Weight as of this encounter: 71.1 kg (156 lb 12.8 oz).  Medication Reconciliation: complete    Gabriela Martinez LPN    Advance Care Directive reviewed    "

## 2024-06-21 NOTE — PROGRESS NOTES
Preventive Care Visit  [facility] \[facility] [place] [facility]""  Lisa Reis MD, Family Medicine  Jun 21, 2024      Assessment & Plan       ICD-10-CM    1. Health care maintenance  Z00.00       2. Moderate episode of recurrent major depressive disorder (H)  F33.1       3. Left arm numbness  R20.0       4. Bilateral hand numbness  R20.0       5. Numbness and tingling of both feet  R20.0     R20.2       6. Thumb weakness  R29.898       7. Vestibular migraine  G43.809         Patient will be referred for bilateral upper and lower extremity EMGs to try to further pinpoint etiology of symptoms.  I think it is possible that she has bilateral carpal tunnel syndrome, but may have compression more proximally.    However, cannot exclude a more systemic process.  Particularly in light of vestibular migraine symptoms, possible autoimmune disease.    I think it would be helpful for her to have a neurology consultation to see if there is anything connecting all of these symptoms/diagnoses.    Reviewed previous extensive lab work.  No need to update at this time.  Plan for lipids every 5 years.  Could obtain glucose screening more often in light of gestational diabetes, but limited other risk factors and previous screening was recently normal.  Will hold off on follow-up for now.        Counseling  Appropriate preventive services were discussed with this patient, including applicable screening as appropriate for fall prevention, nutrition, physical activity, Tobacco-use cessation, weight loss and cognition.  Checklist reviewing preventive services available has been given to the patient.  Reviewed patient's diet, addressing concerns and/or questions.       No follow-ups on file.    Delroy Corrales is a 43 year old, presenting for the following:  Physical (Yearly physical along with the following concerns: Lower extremity edema, migraine headache, and left arm numbness and weakness)        6/21/2024     3:26 PM   Additional Questions    Roomed by Gabriela   Accompanied by self          HPI    Patient presents for a yearly physical.  Her last Pap smear was in 2021, no history of abnormal Pap smears.  She did have a mammogram in the past year.    She has been noticing more weakness in her left thumb and she thinks that she has had some atrophy of the thenar eminence.  She has had issues with bilateral hand tingling for the past 18 months, but seems to be worse recently.  The symptoms of her left hand sometimes progressed and seem to include her elbow, sometimes even up into her shoulder and neck area.  She is always had a lot of tightness in her left neck area.  She has not been able to identify an originating source of the symptoms.  She works as a physical therapist, is wondering about thoracic outlet syndrome.    But she is also often wakes up with bilateral foot tingling.  She also has had some intermittent left leg numbness.    She has had a previous diagnosis of vestibular migraines.  She gets the symptoms about once per month.  But has also had periods of time where she has longer term issues with balance, nystagmus.    She has had previous imaging of her brain.  She saw neurology nurse practitioner in 2021.  She continues to see rheumatology regularly for abnormal labs, without a clear diagnosis.    She was started on Effexor to see if this would help with her vestibular migraines, it has not.  But is managing depression and anxiety symptoms well.    At 1 point she tried nortriptyline, but this caused dry mouth symptoms.  She is reluctant to use any cardiac medications due to baseline low blood pressure and frequent issues with orthostatic hypotension.    She has had fairly extensive lab work in the past for evaluation of paresthesias.  She has not had SPEP/UPEP.        6/18/2024   General Health   How would you rate your overall physical health? Good   Feel stress (tense, anxious, or unable to sleep) To some extent      (!) STRESS  CONCERN      6/18/2024   Nutrition   Three or more servings of calcium each day? (!) NO   Diet: Regular (no restrictions)   How many servings of fruit and vegetables per day? (!) 2-3   How many sweetened beverages each day? (!) 2            6/18/2024   Exercise   Days per week of moderate/strenous exercise 5 days   Average minutes spent exercising at this level 20 min            6/18/2024   Social Factors   Frequency of gathering with friends or relatives Twice a week   Worry food won't last until get money to buy more No   Food not last or not have enough money for food? No   Do you have housing? (Housing is defined as stable permanent housing and does not include staying ouside in a car, in a tent, in an abandoned building, in an overnight shelter, or couch-surfing.) Yes   Are you worried about losing your housing? No   Lack of transportation? No   Unable to get utilities (heat,electricity)? No            6/18/2024   Dental   Dentist two times every year? Yes            6/18/2024   TB Screening   Were you born outside of the [place]? No          Today's PHQ-9 Score:       6/21/2024     3:21 PM   PHQ-9 SCORE   PHQ-9 Total Score MyChart 3 (Minimal depression)   PHQ-9 Total Score 3         6/18/2024   Substance Use   Alcohol more than 3/day or more than 7/wk No   Do you use any other substances recreationally? No        Social History     Tobacco Use    Smoking status: Never    Smokeless tobacco: Never   Vaping Use    Vaping status: Never Used           10/6/2023   LAST FHS-7 RESULTS   1st degree relative breast or ovarian cancer No   Any relative bilateral breast cancer No   Any male have breast cancer No   Any ONE woman have BOTH breast AND ovarian cancer No   Any woman with breast cancer before 50yrs No   2 or more relatives with breast AND/OR ovarian cancer No   2 or more relatives with breast AND/OR bowel cancer No                 6/18/2024   STI Screening   New sexual partner(s) since last STI/HIV test? No     "    History of abnormal Pap smear: No - age 30- 64 PAP with HPV every 5 years recommended       ASCVD Risk   The 10-year ASCVD risk score (Mohamud DK, et al., 2019) is: 0.2%    Values used to calculate the score:      Age: 43 years      Sex: Female      Is Non- : No      Diabetic: No      Tobacco smoker: No      Systolic Blood Pressure: 112 mmHg      Is BP treated: No      HDL Cholesterol: 78 mg/dL      Total Cholesterol: 189 mg/dL        6/18/2024   Contraception/Family Planning   Questions about contraception or family planning No           Reviewed and updated as needed this visit by Provider                       Objective    Exam  /72 (BP Location: Right arm, Patient Position: Sitting, Cuff Size: Adult Regular)   Pulse 75   Temp 98.1  F (36.7  C) (Tympanic)   Resp 16   Ht 1.727 m (5' 8\")   Wt 71.1 kg (156 lb 12.8 oz)   LMP 06/04/2024 (Exact Date)   SpO2 100%   BMI 23.84 kg/m     Estimated body mass index is 23.84 kg/m  as calculated from the following:    Height as of this encounter: 1.727 m (5' 8\").    Weight as of this encounter: 71.1 kg (156 lb 12.8 oz).    Physical Exam  Constitutional:       Appearance: She is well-developed.   Eyes:      General:         Right eye: Right eye discharge: neuro.      Conjunctiva/sclera: Conjunctivae normal.   Neck:      Thyroid: No thyromegaly.   Cardiovascular:      Rate and Rhythm: Normal rate and regular rhythm.      Heart sounds: Normal heart sounds. No murmur heard.  Pulmonary:      Effort: Pulmonary effort is normal. No respiratory distress.      Breath sounds: Normal breath sounds.   Abdominal:      Palpations: Abdomen is soft.   Lymphadenopathy:      Cervical: No cervical adenopathy.   Skin:     Findings: No rash.   Neurological:      Mental Status: She is alert and oriented to person, place, and time.         Signed Electronically by: Lisa Reis MD    "

## 2024-07-16 ENCOUNTER — OFFICE VISIT (OUTPATIENT)
Dept: NEUROLOGY | Facility: OTHER | Age: 44
End: 2024-07-16
Attending: PSYCHIATRY & NEUROLOGY
Payer: COMMERCIAL

## 2024-07-16 VITALS
OXYGEN SATURATION: 99 % | TEMPERATURE: 97 F | WEIGHT: 156.4 LBS | BODY MASS INDEX: 23.7 KG/M2 | SYSTOLIC BLOOD PRESSURE: 124 MMHG | HEART RATE: 70 BPM | DIASTOLIC BLOOD PRESSURE: 70 MMHG | RESPIRATION RATE: 18 BRPM | HEIGHT: 68 IN

## 2024-07-16 DIAGNOSIS — R20.0 NUMBNESS AND TINGLING IN RIGHT HAND: ICD-10-CM

## 2024-07-16 DIAGNOSIS — G83.24: ICD-10-CM

## 2024-07-16 DIAGNOSIS — R20.2 NUMBNESS AND TINGLING IN LEFT HAND: Primary | ICD-10-CM

## 2024-07-16 DIAGNOSIS — R20.0 NUMBNESS AND TINGLING IN LEFT HAND: Primary | ICD-10-CM

## 2024-07-16 DIAGNOSIS — R20.2 NUMBNESS AND TINGLING IN RIGHT HAND: ICD-10-CM

## 2024-07-16 PROCEDURE — 95886 MUSC TEST DONE W/N TEST COMP: CPT | Performed by: PSYCHIATRY & NEUROLOGY

## 2024-07-16 PROCEDURE — 95913 NRV CNDJ TEST 13/> STUDIES: CPT | Performed by: PSYCHIATRY & NEUROLOGY

## 2024-07-16 PROCEDURE — 99204 OFFICE O/P NEW MOD 45 MIN: CPT | Mod: 25 | Performed by: PSYCHIATRY & NEUROLOGY

## 2024-07-16 ASSESSMENT — PAIN SCALES - GENERAL: PAINLEVEL: NO PAIN (0)

## 2024-07-16 NOTE — PROGRESS NOTES
Referring physician: Lisa Reis MD    History: The patient relates paresthesia in the left upper extremity more so than the right.  She also has perceived some weakness of the first digit of the left hand for about 2 years.  There has been concern for carpal tunnel syndrome.    Past medical history is fairly unremarkable.  The patient has generally been quite healthy.    Review of systems is positive for paresthesia in the feet present for about 20 years.  The patient does not describe radicular quality discomfort.  Neck and back pain have not been significant problems.    Family history: Noteworthy only for a single third degree relative diagnosed with multiple sclerosis many years ago    Physical examination: The patient is a healthy appearing woman.  She is a good historian.  Her gait is normal.  Strength is normal throughout in the lower extremities and for the right upper extremity but there is diffuse 4/5 strength for the left upper extremity.  Reflexes are 2+/4 throughout in the upper and lower extremities.  Pinprick is well-preserved in the cervical dermatomes of the upper extremities.  Gait is normal.    Nerve conduction studies: Motor nerve conduction testing was performed bilaterally for the median and ulnar nerves.  Distal latencies, amplitudes, conduction velocities, and H reflex latencies were normal.    Antidromic sensory nerve conduction studies were performed bilaterally for the second digital and fifth digital nerves, and on the left the radial nerve.  Latencies, amplitudes, and conduction velocities were normal.    Monopolar EMG needle examination: EMG was performed bilaterally for the abductor pollicis brevis, first dorsal interosseous, extensor digitorum commonness, triceps, and brachioradialis.  All of the tested muscles showed normal insertional activity.  Motor units were normal in size with normal recruitment.  All of the muscles on the left side showed reduced interference.    Impression:  The patient's physical exam and neurodiagnostic study both suggest a central nervous system etiology for her physical complaints.  The notation of reduced interference with normal recruitment as was seen for all of the muscles of the left upper extremity implies a CNS etiology for weakness.  Given her description of her history I would be particularly suspicious for multiple sclerosis.  I would recommend obtaining MRI of the brain and cervical cord performed with contrast.  She is currently scheduled for electromyography of the lower extremities tomorrow but I do not think that that study needs to be performed.    Findings and concerns were discussed with the patient.

## 2024-07-16 NOTE — LETTER
7/16/2024      Savanna Scott  [address]  [facility] [address]      Dear Colleague,    Thank you for referring your patient, Savanna Scott, to the [facility]. Please see a copy of my visit note below.    Referring physician: Lisa Reis MD    History: The patient relates paresthesia in the left upper extremity more so than the right.  She also has perceived some weakness of the first digit of the left hand for about 2 years.  There has been concern for carpal tunnel syndrome.    Past medical history is fairly unremarkable.  The patient has generally been quite healthy.    Review of systems is positive for paresthesia in the feet present for about 20 years.  The patient does not describe radicular quality discomfort.  Neck and back pain have not been significant problems.    Family history: Noteworthy only for a single third degree relative diagnosed with multiple sclerosis many years ago    Physical examination: The patient is a healthy appearing woman.  She is a good historian.  Her gait is normal.  Strength is normal throughout in the lower extremities and for the right upper extremity but there is diffuse 4/5 strength for the left upper extremity.  Reflexes are 2+/4 throughout in the upper and lower extremities.  Pinprick is well-preserved in the cervical dermatomes of the upper extremities.  Gait is normal.    Nerve conduction studies: Motor nerve conduction testing was performed bilaterally for the median and ulnar nerves.  Distal latencies, amplitudes, conduction velocities, and H reflex latencies were normal.    Antidromic sensory nerve conduction studies were performed bilaterally for the second digital and fifth digital nerves, and on the left the radial nerve.  Latencies, amplitudes, and conduction velocities were normal.    Monopolar EMG needle examination: EMG was performed bilaterally for the abductor pollicis brevis, first dorsal interosseous, extensor digitorum  commonness, triceps, and brachioradialis.  All of the tested muscles showed normal insertional activity.  Motor units were normal in size with normal recruitment.  All of the muscles on the left side showed reduced interference.    Impression: The patient's physical exam and neurodiagnostic study both suggest a central nervous system etiology for her physical complaints.  The notation of reduced interference with normal recruitment as was seen for all of the muscles of the left upper extremity implies a CNS etiology for weakness.  Given her description of her history I would be particularly suspicious for multiple sclerosis.  I would recommend obtaining MRI of the brain and cervical cord performed with contrast.  She is currently scheduled for electromyography of the lower extremities tomorrow but I do not think that that study needs to be performed.    Findings and concerns were discussed with the patient.      Again, thank you for allowing me to participate in the care of your patient.        Sincerely,        Onel Enamorado MD

## 2024-07-16 NOTE — NURSING NOTE
"Chief Complaint   Patient presents with    Numbness     Bilateral Upper Extremity Arm and Hand Numbness        Initial /70 (BP Location: Right arm, Patient Position: Chair, Cuff Size: Adult Regular)   Pulse 70   Temp 97  F (36.1  C) (Temporal)   Resp 18   Ht 1.727 m (5' 8\")   Wt 70.9 kg (156 lb 6.4 oz)   LMP 07/01/2024 (Exact Date)   SpO2 99%   Breastfeeding No   BMI 23.78 kg/m   Estimated body mass index is 23.78 kg/m  as calculated from the following:    Height as of this encounter: 1.727 m (5' 8\").    Weight as of this encounter: 70.9 kg (156 lb 6.4 oz).      Medication Reconciliation: Complete.       Jenifer Cagle LPN on 7/16/2024 at 11:02 AM     "

## 2024-07-17 ENCOUNTER — MYC MEDICAL ADVICE (OUTPATIENT)
Dept: FAMILY MEDICINE | Facility: OTHER | Age: 44
End: 2024-07-17
Payer: COMMERCIAL

## 2024-07-17 DIAGNOSIS — R20.0 LEFT ARM NUMBNESS: ICD-10-CM

## 2024-07-17 DIAGNOSIS — R20.0 BILATERAL HAND NUMBNESS: Primary | ICD-10-CM

## 2024-07-17 DIAGNOSIS — R20.0 NUMBNESS AND TINGLING OF BOTH FEET: ICD-10-CM

## 2024-07-17 DIAGNOSIS — R20.2 NUMBNESS AND TINGLING OF BOTH FEET: ICD-10-CM

## 2024-07-17 DIAGNOSIS — R94.131 ABNORMAL ELECTROMYOGRAM (EMG): ICD-10-CM

## 2024-07-17 NOTE — TELEPHONE ENCOUNTER
Per JULIUS with Nikole from 7/16/24:    Impression: The patient's physical exam and neurodiagnostic study both suggest a central nervous system etiology for her physical complaints. The notation of reduced interference with normal recruitment as was seen for all of the muscles of the left upper extremity implies a CNS etiology for weakness. Given her description of her history I would be particularly suspicious for multiple sclerosis. I would recommend obtaining MRI of the brain and cervical cord performed with contrast. She is currently scheduled for electromyography of the lower extremities tomorrow but I do not think that that study needs to be performed.     Avis Gasca RN on 7/17/2024 at 10:21 AM

## 2024-07-25 ENCOUNTER — MYC MEDICAL ADVICE (OUTPATIENT)
Dept: FAMILY MEDICINE | Facility: OTHER | Age: 44
End: 2024-07-25
Payer: COMMERCIAL

## 2024-07-25 DIAGNOSIS — F40.240 CLAUSTROPHOBIA: Primary | ICD-10-CM

## 2024-07-25 RX ORDER — ALPRAZOLAM 1 MG
TABLET ORAL
Qty: 2 TABLET | Refills: 0 | Status: SHIPPED | OUTPATIENT
Start: 2024-07-25 | End: 2024-09-16

## 2024-08-01 ENCOUNTER — HOSPITAL ENCOUNTER (OUTPATIENT)
Dept: MRI IMAGING | Facility: OTHER | Age: 44
Discharge: HOME | End: 2024-08-01
Attending: FAMILY MEDICINE
Payer: COMMERCIAL

## 2024-08-01 DIAGNOSIS — R20.0 LEFT ARM NUMBNESS: ICD-10-CM

## 2024-08-01 DIAGNOSIS — R20.0 BILATERAL HAND NUMBNESS: ICD-10-CM

## 2024-08-01 DIAGNOSIS — R20.0 NUMBNESS AND TINGLING OF BOTH FEET: ICD-10-CM

## 2024-08-01 DIAGNOSIS — R20.2 NUMBNESS AND TINGLING OF BOTH FEET: ICD-10-CM

## 2024-08-01 DIAGNOSIS — R94.131 ABNORMAL ELECTROMYOGRAM (EMG): ICD-10-CM

## 2024-08-01 PROCEDURE — A9575 INJ GADOTERATE MEGLUMI 0.1ML: HCPCS | Performed by: FAMILY MEDICINE

## 2024-08-01 PROCEDURE — 70553 MRI BRAIN STEM W/O & W/DYE: CPT

## 2024-08-01 PROCEDURE — 255N000002 HC RX 255 OP 636: Performed by: FAMILY MEDICINE

## 2024-08-01 PROCEDURE — 72156 MRI NECK SPINE W/O & W/DYE: CPT

## 2024-08-01 RX ORDER — GADOTERATE MEGLUMINE 376.9 MG/ML
15 INJECTION INTRAVENOUS ONCE
Status: COMPLETED | OUTPATIENT
Start: 2024-08-01 | End: 2024-08-01

## 2024-08-01 RX ADMIN — GADOTERATE MEGLUMINE 15 ML: 376.9 INJECTION INTRAVENOUS at 14:26

## 2024-08-02 ENCOUNTER — MYC MEDICAL ADVICE (OUTPATIENT)
Dept: FAMILY MEDICINE | Facility: OTHER | Age: 44
End: 2024-08-02
Payer: COMMERCIAL

## 2024-08-02 NOTE — TELEPHONE ENCOUNTER
Per comment from 8/1/24 MR brain:     Lets hope the MRI findings are due to your known migraine condition. Do you have a neurology consult scheduled? MD Avis Washburn RN on 8/2/2024 at 2:59 PM

## 2024-09-03 ENCOUNTER — MYC MEDICAL ADVICE (OUTPATIENT)
Dept: FAMILY MEDICINE | Facility: OTHER | Age: 44
End: 2024-09-03
Payer: COMMERCIAL

## 2024-09-03 DIAGNOSIS — R20.2 NUMBNESS AND TINGLING OF BOTH FEET: ICD-10-CM

## 2024-09-03 DIAGNOSIS — R20.0 NUMBNESS AND TINGLING OF BOTH FEET: ICD-10-CM

## 2024-09-03 DIAGNOSIS — R20.0 LEFT ARM NUMBNESS: ICD-10-CM

## 2024-09-03 DIAGNOSIS — R20.0 BILATERAL HAND NUMBNESS: Primary | ICD-10-CM

## 2024-09-03 DIAGNOSIS — R94.131 ABNORMAL ELECTROMYOGRAM (EMG): ICD-10-CM

## 2024-09-12 ENCOUNTER — TRANSCRIBE ORDERS (OUTPATIENT)
Dept: OTHER | Age: 44
End: 2024-09-12

## 2024-09-12 DIAGNOSIS — R20.0 NUMBNESS AND TINGLING OF BOTH FEET: ICD-10-CM

## 2024-09-12 DIAGNOSIS — R20.2 NUMBNESS AND TINGLING OF BOTH FEET: ICD-10-CM

## 2024-09-12 DIAGNOSIS — R20.0 BILATERAL HAND NUMBNESS: Primary | ICD-10-CM

## 2024-09-12 DIAGNOSIS — R20.0 LEFT ARM NUMBNESS: ICD-10-CM

## 2024-09-12 DIAGNOSIS — R94.131 ABNORMAL ELECTROMYOGRAM (EMG): ICD-10-CM

## 2024-09-16 ENCOUNTER — OFFICE VISIT (OUTPATIENT)
Dept: NEUROLOGY | Facility: CLINIC | Age: 44
End: 2024-09-16
Attending: FAMILY MEDICINE
Payer: COMMERCIAL

## 2024-09-16 VITALS
HEART RATE: 75 BPM | WEIGHT: 157 LBS | HEIGHT: 68 IN | SYSTOLIC BLOOD PRESSURE: 132 MMHG | DIASTOLIC BLOOD PRESSURE: 74 MMHG | BODY MASS INDEX: 23.79 KG/M2

## 2024-09-16 DIAGNOSIS — G43.109 COMPLICATED MIGRAINE: ICD-10-CM

## 2024-09-16 DIAGNOSIS — R76.8 RHEUMATOID FACTOR POSITIVE: ICD-10-CM

## 2024-09-16 DIAGNOSIS — R20.2 PARESTHESIA: Primary | ICD-10-CM

## 2024-09-16 DIAGNOSIS — G37.9 DEMYELINATING DISEASE OF CENTRAL NERVOUS SYSTEM (H): ICD-10-CM

## 2024-09-16 PROBLEM — R53.83 FATIGUE: Status: RESOLVED | Noted: 2023-05-19 | Resolved: 2024-09-16

## 2024-09-16 PROCEDURE — G2211 COMPLEX E/M VISIT ADD ON: HCPCS | Performed by: PSYCHIATRY & NEUROLOGY

## 2024-09-16 PROCEDURE — 99215 OFFICE O/P EST HI 40 MIN: CPT | Performed by: PSYCHIATRY & NEUROLOGY

## 2024-09-16 RX ORDER — LORAZEPAM 1 MG/1
TABLET ORAL
Qty: 1 TABLET | Refills: 0 | Status: SHIPPED | OUTPATIENT
Start: 2024-09-16

## 2024-09-16 NOTE — NURSING NOTE
Chief Complaint   Patient presents with    Numbness     LUE numbness, tingling and weakness     Headache     Migraines and dizzy spells- patient reports maybe 1x per month and sometimes comes in clusters      Gabriela Anderson CMA on 9/16/2024 at 1:32 PM  [facility]

## 2024-09-16 NOTE — PROGRESS NOTES
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  She likely has a complicated migraine and at present I have recommended continuing with Effexor but if her frequency does not decline we can consider adding low-dose of Depakote or Topamax    Paresthesias  She developed left arm paresthesias and had an EMG at an outside facility that was read as normal.  MRI brain and cervical spine showed changes likely due to her migraine headaches but she had mild left neuroforaminal stenosis at C5-C6 that conceivably can cause numbness in her thumb.  Other possibility include thoracic outlet syndrome and I have recommended:    1.  Repeat EMG upper extremity to rule out left C6 radiculopathy, thoracic outlet syndrome and carpal tunnel syndrome  2.  MRI thoracic spine with and without contrast  3.  X-ray cervical spine to rule out cervical rib  4.  Lab work to include B12, MMA, HTLV 1/2, folate, antinuclear antibody  5.  Follow-up the day she is scheduled for EMG    Thank you again for this referral, please feel free to contact me if you have any questions.    Frank Thakur MD  [facility]     REASON FOR CONSULTATION Numbness and Headache        HISTORY OF PRESENT ILLNESS     We have been requested by Dr. 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  According to patient she has a long history of migraine headache that usually are preceded by aura and sometimes she has visual symptoms without any headache.  There are other times when she gets headaches along with word finding difficulty, slurred speech and she is foggy afterwards.  These episodes have been going on for 15 to 20 years.  In 2015 she had a bout of dizziness was seen by neurology but no etiology was found.  In 2023 she started having left thumb numbness and weakness and had an EMG that was read as normal but she has noticed weakness in her left hand intrinsic.  She was started on Effexor but has not noticed any change in her headaches although she has not experienced further episodes of dizziness MRI of the brain and cervical spine were done that showed nonspecific white matter changes that can be seen in the setting of chronic migraine and there was ill-defined linear focus of enhancement in the anterior right frontal lobe likely developmental venous anomaly.  MRI cervical spine showed mild left neuroforaminal stenosis at C5-C6.     PROBLEM LIST   Patient Active Problem List   Diagnosis    Anxiety    Bee sting allergy    Classic migraine with aura    Dry eye syndrome of both eyes    Fatigue    Gastroesophageal reflux disease without esophagitis    Moderate episode of recurrent major depressive disorder (H)    Myopia of both eyes with regular astigmatism    Seasonal allergic rhinitis due to pollen    Vitreous floaters of both eyes         PAST MEDICAL & SURGICAL HISTORY     Past Medical History:   Patient  has a past medical history of Anxiety, Classic migraine with aura, Elevated rheumatoid factor (10/2015), Fatigue, Gastroesophageal reflux disease with esophagitis, Gestational  diabetes mellitus, antepartum (02/23/2018), Hypermobility of joint, Seasonal allergic rhinitis, Vitamin D deficiency (08/19/2014), and Vocal cord paralysis (05/25/2018).    Surgical History:  She  has a past surgical history that includes Egd (02/15/2010); Colonoscopy (01/05/2010); Laparoscopy diagnostic (general); wisdom teeth extraction; and upper gi endoscopy (05/11/2021).     SOCIAL HISTORY     Reviewed, and she  reports that she has never smoked. She has been exposed to tobacco smoke. She has never used smokeless tobacco. She reports current alcohol use. She reports that she does not use drugs.     FAMILY HISTORY     Reviewed, and family history includes Cancer in her paternal grandfather; Cervical Cancer in her maternal aunt; Diabetes in her paternal grandfather; Glaucoma in her maternal grandfather and paternal grandfather; Heart Disease in her maternal grandmother; Hyperlipidemia in her mother; Hypertension in her mother; Hypothyroidism in her mother; Lung Cancer in her paternal grandmother; Migraines in her mother; Myocardial Infarction in her maternal grandmother; No Known Problems in her brother and son; Osteoporosis in her mother; Other - See Comments in her mother; Prostate Cancer in her maternal grandfather; Rheumatic fever in her father; Rheumatoid Arthritis in her maternal aunt.     ALLERGIES     Allergies   Allergen Reactions    Insulin Aspart Rash     Allergy to suspension not medication    Sulfa Antibiotics Rash     RASH    Amoxicillin-Pot Clavulanate Diarrhea    Insulin Lispro Unknown     Allergy to the suspension not the medication         REVIEW OF SYSTEMS     A 12 point review of system was performed and was negative except as outlined in the history of present illness.     HOME MEDICATIONS     Current Outpatient Rx   Medication Sig Dispense Refill    cholecalciferol 50 MCG (2000 UT) CAPS Take by mouth once daily.      Multiple Vitamin (MULTIVITAMIN ADULT PO) Take by mouth once daily.       "naproxen sodium (ANAPROX) 220 MG tablet by mouth. Takes one to two tabs as needed      venlafaxine (EFFEXOR XR) 75 MG 24 hr capsule Take 1 capsule (75 mg) by mouth daily 90 capsule 3         PHYSICAL EXAM     Vital signs  /74 (BP Location: Left arm, Patient Position: Sitting)   Pulse 75   Ht 1.727 m (5' 8\")   Wt 71.2 kg (157 lb)   BMI 23.87 kg/m      Weight:   157 lbs 0 oz    Middle-age female who is alert and oriented x 3 no acute distress.  Vital signs are reviewed and documented in electronic medical record.  Neck supple.  Neurologically speech was normal.  Cranial nerves II through XII are intact.  Motor strength 5/5.  Reflexes 3+ in the left upper extremity 2+ in the right upper extremity.  Lower extremity bilaterally 3+ toes equivocal sensation was intact to light touch pinprick.  No dysmetria noted on finger-nose testing heel-knee-shin gait normal Romberg negative.  She is able to walk on her toes and heels without any difficulty     PERTINENT DIAGNOSTIC STUDIES     Following studies were reviewed:     MRI BRAIN 8/1/2024  1. No acute intracranial abnormality.  2. Mild white matter changes, nonspecific, but can be seen in the  setting of chronic migraine headaches, prior infectious or  inflammatory insults, vasculopathy, or small vessel ischemic disease  or demyelination.  3. Subtle ill-defined linear focus of enhancement in the anterior  right frontal lobe, likely benign developmental venous anomaly.  Consider 6 month follow-up.    MRI CERVICAL SPINE 8/1/2024  No suspicious enhancement in the cervical spinal cord, thecal sac or  vertebrae.     Mild left foraminal narrowing at C5-6 secondary to a foraminal disc  protrusion, otherwise no significant spinal canal or foraminal  narrowing.    MRI BRAIN 7/22/2021  1.  No acute intracranial abnormality. No evidence of acute infarct,   hemorrhage, or mass.      PERTINENT LABS  Following labs were reviewed:  No visits with results within 3 Month(s) from this " visit.   Latest known visit with results is:   Transferred Records on 12/29/2022   Component Date Value Ref Range Status    HPV Abstract 07/14/2021 See Scanned Document   Final    HIV 1&2 EXT 08/09/2017 Non-Reactive   Final    Hep C HIM 06/05/2020 See Scanned Document   Final    Cholesterol (External) 07/21/2021 189  <=200 mg/dL Final    Triglycerides (External) 07/21/2021 80  <=150 mg/dL Final    HDL Cholesterol (External) 07/21/2021 78  >=40 mg/dL Final    LDL Cholesterol Calculated (Extern* 07/21/2021 95  <=130 mg/dL Final    Hemoglobin A1C (External) 07/18/2022 5.0  <=5.7 % Final    TSH (External) 07/18/2022 0.64  0.30 - 4.70 uIU/mL Final    Potassium (External) 07/18/2022 3.8  3.5 - 5.1 mmol/L Final    Glucose (External) 07/18/2022 98  70 - 100 mg/dL Final    AST (External) 12/29/2022 19  5 - 41 U/L Final    ALT (External) 12/29/2022 15  8 - 45 U/L Final    Creatinine (External) 12/29/2022 0.77  0.57 - 1.11 mg/dL Final    GFR Estimated (External) 12/29/2022 >60  >=60 mL/min/1.73m2 Final        Total time spent for face to face visit, reviewing labs/imaging studies, counseling and coordination of care was: 1 Hour spent on the date of the encounter doing chart review, review of outside records, review of test results, interpretation of tests, patient visit, and documentation     The longitudinal plan of care for the diagnosis(es)/condition(s) as documented were addressed during this visit. Due to the added complexity in care, I will continue to support Savanna in the subsequent management and with ongoing continuity of care.    This note was dictated using voice recognition software.  Any grammatical or context distortions are unintentional and inherent to the software.    No orders of the defined types were placed in this encounter.     New Prescriptions    No medications on file      Modified Medications    No medications on file

## 2024-09-16 NOTE — LETTER
2024      Savanna Scott  [address]  [facility] [address]      Dear Colleague,    Thank you for referring your patient, Savanna Scott, to the [facility] NEUROLOGY CLINIC [place]. Please see a copy of my visit note below.    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  She likely has a complicated migraine and at present I have recommended continuing with Effexor but if her frequency does not decline we can consider adding low-dose of Depakote or Topamax    Paresthesias  She developed left arm paresthesias and had an EMG at an outside facility that was read as normal.  MRI brain and cervical spine showed changes likely due to her migraine headaches but she had mild left neuroforaminal stenosis at C5-C6 that conceivably can cause numbness in her thumb.  Other possibility include thoracic outlet syndrome and I have recommended:    1.  Repeat EMG upper extremity to rule out left C6 radiculopathy, thoracic outlet syndrome and carpal tunnel syndrome  2.  MRI thoracic spine with and without contrast  3.  X-ray cervical spine to rule out cervical rib  4.  Lab work to include B12, MMA, HTLV 1/2, folate, antinuclear antibody  5.  Follow-up the day she is scheduled for EMG    Thank you again for this referral, please feel free to contact me if you have any questions.    Frank Thakur,  MD [facility]     REASON FOR CONSULTATION Numbness and Headache        HISTORY OF PRESENT ILLNESS     We have been requested by Dr. 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  According to patient she has a long history of migraine headache that usually are preceded by aura and sometimes she has visual symptoms without any headache.  There are other times when she gets headaches along with word finding difficulty, slurred speech and she is foggy afterwards.  These episodes have been going on for 15 to 20 years.  In 2015 she had a bout of dizziness was seen by neurology but no etiology was found.  In 2023 she started having left thumb numbness and weakness and had an EMG that was read as normal but she has noticed weakness in her left hand intrinsic.  She was started on Effexor but has not noticed any change in her headaches although she has not experienced further episodes of dizziness MRI of the brain and cervical spine were done that showed nonspecific white matter changes that can be seen in the setting of chronic migraine and there was ill-defined linear focus of enhancement in the anterior right frontal lobe likely developmental venous anomaly.  MRI cervical spine showed mild left neuroforaminal stenosis at C5-C6.     PROBLEM LIST   Patient Active Problem List   Diagnosis     Anxiety     Bee sting allergy     Classic migraine with aura     Dry eye syndrome of both eyes     Fatigue     Gastroesophageal reflux disease without esophagitis     Moderate episode of recurrent major depressive disorder (H)     Myopia of both eyes with regular astigmatism     Seasonal allergic rhinitis due to pollen     Vitreous floaters of both eyes         PAST  MEDICAL & SURGICAL HISTORY     Past Medical History:   Patient  has a past medical history of Anxiety, Classic migraine with aura, Elevated rheumatoid factor (10/2015), Fatigue, Gastroesophageal reflux disease with esophagitis, Gestational diabetes mellitus, antepartum (02/23/2018), Hypermobility of joint, Seasonal allergic rhinitis, Vitamin D deficiency (08/19/2014), and Vocal cord paralysis (05/25/2018).    Surgical History:  She  has a past surgical history that includes Egd (02/15/2010); Colonoscopy (01/05/2010); Laparoscopy diagnostic (general); wisdom teeth extraction; and upper gi endoscopy (05/11/2021).     SOCIAL HISTORY     Reviewed, and she  reports that she has never smoked. She has been exposed to tobacco smoke. She has never used smokeless tobacco. She reports current alcohol use. She reports that she does not use drugs.     FAMILY HISTORY     Reviewed, and family history includes Cancer in her paternal grandfather; Cervical Cancer in her maternal aunt; Diabetes in her paternal grandfather; Glaucoma in her maternal grandfather and paternal grandfather; Heart Disease in her maternal grandmother; Hyperlipidemia in her mother; Hypertension in her mother; Hypothyroidism in her mother; Lung Cancer in her paternal grandmother; Migraines in her mother; Myocardial Infarction in her maternal grandmother; No Known Problems in her brother and son; Osteoporosis in her mother; Other - See Comments in her mother; Prostate Cancer in her maternal grandfather; Rheumatic fever in her father; Rheumatoid Arthritis in her maternal aunt.     ALLERGIES     Allergies   Allergen Reactions     Insulin Aspart Rash     Allergy to suspension not medication     Sulfa Antibiotics Rash     RASH     Amoxicillin-Pot Clavulanate Diarrhea     Insulin Lispro Unknown     Allergy to the suspension not the medication         REVIEW OF SYSTEMS     A 12 point review of system was performed and was negative except as outlined in the history of  "present illness.     HOME MEDICATIONS     Current Outpatient Rx   Medication Sig Dispense Refill     cholecalciferol 50 MCG (2000 UT) CAPS Take by mouth once daily.       Multiple Vitamin (MULTIVITAMIN ADULT PO) Take by mouth once daily.       naproxen sodium (ANAPROX) 220 MG tablet by mouth. Takes one to two tabs as needed       venlafaxine (EFFEXOR XR) 75 MG 24 hr capsule Take 1 capsule (75 mg) by mouth daily 90 capsule 3         PHYSICAL EXAM     Vital signs  /74 (BP Location: Left arm, Patient Position: Sitting)   Pulse 75   Ht 1.727 m (5' 8\")   Wt 71.2 kg (157 lb)   BMI 23.87 kg/m      Weight:   157 lbs 0 oz    Middle-age female who is alert and oriented x 3 no acute distress.  Vital signs are reviewed and documented in electronic medical record.  Neck supple.  Neurologically speech was normal.  Cranial nerves II through XII are intact.  Motor strength 5/5.  Reflexes 3+ in the left upper extremity 2+ in the right upper extremity.  Lower extremity bilaterally 3+ toes equivocal sensation was intact to light touch pinprick.  No dysmetria noted on finger-nose testing heel-knee-shin gait normal Romberg negative.  She is able to walk on her toes and heels without any difficulty     PERTINENT DIAGNOSTIC STUDIES     Following studies were reviewed:     MRI BRAIN 8/1/2024  1. No acute intracranial abnormality.  2. Mild white matter changes, nonspecific, but can be seen in the  setting of chronic migraine headaches, prior infectious or  inflammatory insults, vasculopathy, or small vessel ischemic disease  or demyelination.  3. Subtle ill-defined linear focus of enhancement in the anterior  right frontal lobe, likely benign developmental venous anomaly.  Consider 6 month follow-up.    MRI CERVICAL SPINE 8/1/2024  No suspicious enhancement in the cervical spinal cord, thecal sac or  vertebrae.     Mild left foraminal narrowing at C5-6 secondary to a foraminal disc  protrusion, otherwise no significant spinal " canal or foraminal  narrowing.    MRI BRAIN 7/22/2021  1.  No acute intracranial abnormality. No evidence of acute infarct,   hemorrhage, or mass.      PERTINENT LABS  Following labs were reviewed:  No visits with results within 3 Month(s) from this visit.   Latest known visit with results is:   Transferred Records on 12/29/2022   Component Date Value Ref Range Status     HPV Abstract 07/14/2021 See Scanned Document   Final     HIV 1&2 EXT 08/09/2017 Non-Reactive   Final     Hep C HIM 06/05/2020 See Scanned Document   Final     Cholesterol (External) 07/21/2021 189  <=200 mg/dL Final     Triglycerides (External) 07/21/2021 80  <=150 mg/dL Final     HDL Cholesterol (External) 07/21/2021 78  >=40 mg/dL Final     LDL Cholesterol Calculated (Extern* 07/21/2021 95  <=130 mg/dL Final     Hemoglobin A1C (External) 07/18/2022 5.0  <=5.7 % Final     TSH (External) 07/18/2022 0.64  0.30 - 4.70 uIU/mL Final     Potassium (External) 07/18/2022 3.8  3.5 - 5.1 mmol/L Final     Glucose (External) 07/18/2022 98  70 - 100 mg/dL Final     AST (External) 12/29/2022 19  5 - 41 U/L Final     ALT (External) 12/29/2022 15  8 - 45 U/L Final     Creatinine (External) 12/29/2022 0.77  0.57 - 1.11 mg/dL Final     GFR Estimated (External) 12/29/2022 >60  >=60 mL/min/1.73m2 Final        Total time spent for face to face visit, reviewing labs/imaging studies, counseling and coordination of care was: 1 Hour spent on the date of the encounter doing chart review, review of outside records, review of test results, interpretation of tests, patient visit, and documentation     The longitudinal plan of care for the diagnosis(es)/condition(s) as documented were addressed during this visit. Due to the added complexity in care, I will continue to support Savanna in the subsequent management and with ongoing continuity of care.    This note was dictated using voice recognition software.  Any grammatical or context distortions are unintentional and inherent  to the software.    No orders of the defined types were placed in this encounter.     New Prescriptions    No medications on file      Modified Medications    No medications on file                Again, thank you for allowing me to participate in the care of your patient.        Sincerely,        Frank Thakur MD

## 2024-09-19 ENCOUNTER — HOSPITAL ENCOUNTER (OUTPATIENT)
Dept: GENERAL RADIOLOGY | Facility: OTHER | Age: 44
Discharge: HOME | End: 2024-09-19
Attending: PSYCHIATRY & NEUROLOGY
Payer: COMMERCIAL

## 2024-09-19 ENCOUNTER — LAB (OUTPATIENT)
Dept: LAB | Facility: OTHER | Age: 44
End: 2024-09-19
Attending: FAMILY MEDICINE
Payer: COMMERCIAL

## 2024-09-19 DIAGNOSIS — G37.9 DEMYELINATING DISEASE OF CENTRAL NERVOUS SYSTEM (H): ICD-10-CM

## 2024-09-19 DIAGNOSIS — R20.2 PARESTHESIA: ICD-10-CM

## 2024-09-19 LAB
FOLATE SERPL-MCNC: 12 NG/ML (ref 4.6–34.8)
VIT B12 SERPL-MCNC: 674 PG/ML (ref 232–1245)

## 2024-09-19 PROCEDURE — 86038 ANTINUCLEAR ANTIBODIES: CPT | Mod: ZL

## 2024-09-19 PROCEDURE — 72040 X-RAY EXAM NECK SPINE 2-3 VW: CPT

## 2024-09-19 PROCEDURE — 83921 ORGANIC ACID SINGLE QUANT: CPT | Mod: ZL

## 2024-09-19 PROCEDURE — 86790 VIRUS ANTIBODY NOS: CPT | Mod: ZL

## 2024-09-19 PROCEDURE — 82607 VITAMIN B-12: CPT | Mod: ZL

## 2024-09-19 PROCEDURE — 82746 ASSAY OF FOLIC ACID SERUM: CPT | Mod: ZL

## 2024-09-19 PROCEDURE — 36415 COLL VENOUS BLD VENIPUNCTURE: CPT | Mod: ZL

## 2024-09-20 LAB — ANA SER QL IF: NEGATIVE

## 2024-09-21 LAB — HTLV I+II AB SER QL IA: NEGATIVE

## 2024-09-24 ENCOUNTER — HOSPITAL ENCOUNTER (OUTPATIENT)
Dept: MRI IMAGING | Facility: OTHER | Age: 44
Discharge: HOME | End: 2024-09-24
Attending: PSYCHIATRY & NEUROLOGY | Admitting: PSYCHIATRY & NEUROLOGY
Payer: COMMERCIAL

## 2024-09-24 DIAGNOSIS — G37.9 DEMYELINATING DISEASE OF CENTRAL NERVOUS SYSTEM (H): ICD-10-CM

## 2024-09-24 PROCEDURE — 255N000002 HC RX 255 OP 636: Performed by: PSYCHIATRY & NEUROLOGY

## 2024-09-24 PROCEDURE — A9575 INJ GADOTERATE MEGLUMI 0.1ML: HCPCS | Performed by: PSYCHIATRY & NEUROLOGY

## 2024-09-24 PROCEDURE — 72157 MRI CHEST SPINE W/O & W/DYE: CPT

## 2024-09-24 RX ORDER — GADOTERATE MEGLUMINE 376.9 MG/ML
15 INJECTION INTRAVENOUS ONCE
Status: COMPLETED | OUTPATIENT
Start: 2024-09-24 | End: 2024-09-24

## 2024-09-24 RX ADMIN — GADOTERATE MEGLUMINE 14 ML: 376.9 INJECTION INTRAVENOUS at 22:23

## 2024-09-25 LAB — METHYLMALONATE SERPL-SCNC: 0.24 UMOL/L (ref 0–0.4)

## 2024-10-10 ENCOUNTER — MYC MEDICAL ADVICE (OUTPATIENT)
Dept: FAMILY MEDICINE | Facility: OTHER | Age: 44
End: 2024-10-10

## 2024-10-10 DIAGNOSIS — R20.0 BILATERAL HAND NUMBNESS: ICD-10-CM

## 2024-10-10 DIAGNOSIS — R94.131 ABNORMAL ELECTROMYOGRAM (EMG): ICD-10-CM

## 2024-10-10 DIAGNOSIS — R20.0 LEFT ARM NUMBNESS: Primary | ICD-10-CM

## 2024-10-10 NOTE — TELEPHONE ENCOUNTER
Pt is wanting PT referral for her L arm symptoms/thoracic outlet.     Diallo'd up order.     Routing to provider to review and respond.  Avis Gasca RN on 10/10/2024 at 2:23 PM

## 2024-11-05 ENCOUNTER — MYC REFILL (OUTPATIENT)
Dept: FAMILY MEDICINE | Facility: OTHER | Age: 44
End: 2024-11-05
Payer: COMMERCIAL

## 2024-11-05 DIAGNOSIS — F33.1 MODERATE EPISODE OF RECURRENT MAJOR DEPRESSIVE DISORDER (H): ICD-10-CM

## 2024-11-06 RX ORDER — VENLAFAXINE HYDROCHLORIDE 75 MG/1
75 CAPSULE, EXTENDED RELEASE ORAL DAILY
Qty: 90 CAPSULE | Refills: 3 | OUTPATIENT
Start: 2024-11-06

## 2024-11-06 NOTE — TELEPHONE ENCOUNTER
[facility] sent Rx request for the following:      Requested Prescriptions   Pending Prescriptions Disp Refills    venlafaxine (EFFEXOR XR) 75 MG 24 hr capsule 90 capsule 3     Sig: Take 1 capsule (75 mg) by mouth daily.       Serotonin-Norepinephrine Reuptake Inhibitors  Passed - 11/6/2024  3:04 PM        Passed - Most recent blood pressure under 140/90 in past 12 months     BP Readings from Last 3 Encounters:   09/16/24 132/74   07/16/24 124/70   06/21/24 112/72       No data recorded            Passed - PHQ-9 score of less than 5 in past 6 months     Please review last PHQ-9 score.           Passed - Medication is active on med list        Passed - Recent (12 mo) or future (90 days) visit within the authorizing provider's specialty     The patient must have completed an in-person or virtual visit within the past 12 months or has a future visit scheduled within the next 90 days with the authorizing provider s specialty.  Urgent care and e-visits do not quality as an office visit for this protocol.          Passed - Medication indicated for associated diagnosis     Medication is associated with one or more of the following diagnoses:  Anxiety  Bipolar  Chronic musculoskeletal pain  Depression  Fibromyalgia  Headache  Migraine  Neuropathy  Obsessive compulsive disorder  Panic disorder  Social phobia  Mood disorder  Menopause  Hot flashes/Menopausal flushing  Fibromyitis  Flushing          Passed - Patient is age 18 or older        Passed - No active pregnancy on record        Passed - No positive pregnancy test in past 12 months             Last Prescription Date:   6/21/24  Last Fill Qty/Refills:         90, R-3    Last Office Visit:              6/21/24   Future Office visit:          none    Early refill request    Sneha Jacob RN on 11/6/2024 at 3:06 PM

## 2024-12-12 ENCOUNTER — THERAPY VISIT (OUTPATIENT)
Dept: PHYSICAL THERAPY | Facility: OTHER | Age: 44
End: 2024-12-12
Attending: FAMILY MEDICINE
Payer: COMMERCIAL

## 2024-12-12 DIAGNOSIS — R94.131 ABNORMAL ELECTROMYOGRAM (EMG): ICD-10-CM

## 2024-12-12 DIAGNOSIS — R20.0 BILATERAL HAND NUMBNESS: ICD-10-CM

## 2024-12-12 DIAGNOSIS — R20.0 LEFT ARM NUMBNESS: Primary | ICD-10-CM

## 2024-12-12 PROCEDURE — 97112 NEUROMUSCULAR REEDUCATION: CPT | Mod: GP

## 2024-12-12 PROCEDURE — 97161 PT EVAL LOW COMPLEX 20 MIN: CPT | Mod: GP

## 2024-12-12 PROCEDURE — 97140 MANUAL THERAPY 1/> REGIONS: CPT | Mod: GP

## 2024-12-12 NOTE — PROGRESS NOTES
PHYSICAL THERAPY EVALUATION  Type of Visit: Evaluation       Fall Risk Screen:  Fall screen completed by: PT  Have you fallen 2 or more times in the past year?: No  Have you fallen and had an injury in the past year?: No  Is patient a fall risk?: No    Subjective         Presenting condition or subjective complaint: (Patient-Rptd) Neck, arm pain symptoms  Date of onset: 10/11/24 (using referral date: headaches have been longstanding. Arm symptoms began in 2023.)    Relevant medical history: (Patient-Rptd) Depression; Dizziness; Migraines or headaches   Dates & types of surgery: (Patient-Rptd) Septoplasty turbinitomy approx 2009, exploratory laparoscopy 2014, upper endoscopy, wisdom teeth removal 2002    Patient is referred to PT due to left arm numbness (R20.0), abnormal electromyogram (EMG) (R94.131) and bilateral hand numbness (R20.0). Patient notes she has left>right neck pain with intermittent headaches as well as numbness into both UEs and left hand weakness/left LE weakness. She has had extensive work up beginning with an abnormal EMG, thinking she had a CNS issue. She was most recently evaluated but Dr. Frank Thakur at [facility] in [place].   History as noted by Dr. 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 According to patient she has a long history of migraine headache that usually are preceded by aura and sometimes she has visual symptoms without any headache. There are other times when she gets headaches along with word finding difficulty, slurred speech and she is foggy afterwards. These episodes have been going on for 15 to 20 years. In 2015 she had a bout of dizziness was seen by neurology but no etiology was found. In 2023 she started having left thumb numbness and  weakness and had an EMG that was read as normal but she has noticed weakness in her left hand intrinsic. She was started on Effexor but has not noticed any change in her headaches although she has not experienced further episodes of dizziness MRI of the brain and cervical spine were done that showed nonspecific white matter changes that can be seen in the setting of chronic migraine and there was ill-defined linear focus of enhancement in the anterior right frontal lobe likely developmental venous anomaly. MRI cervical spine showed mild left neuroforaminal stenosis at C5-C6.     She currently states that she is not as bad as she was when the PT referral was placed. She wonders if the finger numbness is related to neuropathy related to her rheumatoid issues.  Reports numbness more on the left than the right in the thumb, index and middle fingers. They are numb and not tingling. Has gotten worse at texting, feeling keys on the phone, fine motor things. Has not had a lot of pain symptoms-- rarely has pain shooting down the arm. Lifting her arm overhead and  putting hand on hip causes symptoms on the left due to dural tension. R arm has a little dural tension. Has neck pain on the left and tightness on right. Gripping was bad through the summer with a good amount of weakness. This is better.     Pain:   Location: Constant neck pain on the left. Tightness on the right Some aching pain down the arm to the elbow which she thinks is referred--Feels her pain distribution does not seem related to the disc protrusion  Ratin/10 currently. Ranges from 1/10 6/10  Description: achy and sore.  Numbness Location: digits 1-3 L>R, sometimes down the entire arm depending on position.  Aggravating factors: placing hand on hip, reaching overhead, lifting causes neck pain. Will awaken with numbness some mornings but sleeps all curled up  Easing factors: change position and rest  Diagnostics:  24 EMG with Dr. Agustin: Impression:  The patient's physical exam and neurodiagnostic study both suggest a central nervous system etiology for her physical complaints. The notation of reduced interference with normal recruitment as was seen for all of the muscles of the left upper extremity implies a CNS etiology for weakness. Given her description of her history I would be particularly suspicious for multiple sclerosis. I would recommend obtaining MRI of the brain and cervical cord performed with contrast.   8-1-24 Brain MRI: IMPRESSION: 1. No acute intracranial abnormality. 2. Mild white matter changes, nonspecific, but can be seen in the setting of chronic migraine headaches, prior infectious or inflammatory insults, vasculopathy, or small vessel ischemic disease or demyelination. 3. Subtle ill-defined linear focus of enhancement in the anterior right frontal lobe, likely benign developmental venous anomaly. Consider 6 month follow-up.     8-1-24 Cervical MRI IMPRESSION: No suspicious enhancement in the cervical spinal cord, thecal sac or vertebrae. Mild left foraminal narrowing at C5-6 secondary to a foraminal disc protrusion, otherwise no significant spinal canal or foraminal narrowing.     Cervical xray 9-19-24: Cervical spine alignment is normal. There is slight reversal of the typical cervical lordosis centered at C5-C6. No acute fracture or subluxation. No cervical rib is demonstrated. Minimal scattered degenerative changes. Prevertebral soft tissues are normal in caliber and contour.    9-24-24 MRI Thoracic spine: IMPRESSION: Negative study. No signal abnormality/enhancement is present to suggest a demyelinating process.    Past treatments: Jan 2023--had some PT with stretching and soft tissue work. Neck massages   Pt goal: To reduce neck pain to not daily and to reach overhead and gripping without issues.   Past medical hx/comorbidities: anxiety, classic migraine with aura, elevated rheumatoid factor, fatigue, GERD, joint hypermobility, vocal  cord paralysis, left thumb weakness, vestibular migraines, bilateral foot tingling, L LE numbness,    Prior Level of Function Independent      Living Environment  Social support: (Patient-Rptd) With a significant other or spouse   Type of home: (Patient-Rptd) House; 2-story; Basement   Stairs to enter the home: (Patient-Rptd) Yes (Patient-Rptd) 3 Is there a railing: (Patient-Rptd) Yes     Ramp: (Patient-Rptd) No   Stairs inside the home: (Patient-Rptd) Yes (Patient-Rptd) 12 Is there a railing: (Patient-Rptd) Yes     Help at home: (Patient-Rptd) None  Equipment owned:       Employment: (Patient-Rptd) Yes (Patient-Rptd) Physical Therapist  Hobbies/Interests: (Patient-Rptd) Chata,sewing    Patient goals for therapy: (Patient-Rptd) Lift and grasp         Objective   Structure and Posture:    Generalized hypermobility noted.  Hand dominance: right  Head and neck position: Normal lordosis with fulcrum around C56 level  Shoulders and scapula: forward, rounded  Thoracic spine: flattened with lower thoracic lordosis. Increased tissue fullness in the mid to lower thoracic spine      General listening:     Standing: Left cervicothoracic junction laterally   Seated: same  Postural loading R/R L/L R/L L/R   Head  x x x   Shoulders  x     Pelvis       Increased excursion/decreased  postural stability noted with all loading    Local listening: Listening through the upper extremities to the left anterior upper thorax--first rib and pleural dome region      Cranial screen: not tested but will require further assessment if indicated    Rectus Capitus Posterior Minor: right sided restrictions--spinal dural tightness      Active Mobility Testing: Cervical AROM is WNL other than very mildly  restricted extension but pt does tightness and pulling in the soft tissues with her range of motion.  Decreased thoracic extension noted, particularly mid and lower.  Patient has noted to have full shoulder range of motion although she has  difficulty with scapulothoracic mechanics as well as upper extremity symptoms with any overhead work.  She has noted to have decreased Apley scratch test with right external rotation and left internal rotation.    Provocation Tests:   Spurling's (extension/SB/Rotation): Negative      Positional Thoracic Facet Diagnoses:   ERS Right ERS Left Neutral  SB R/Rot Left Neutral  SB L/Rot Right FRS Right FRS Left   C7-T1     x    T1-2     x    T2-3         T3-4         T4-5     x    T5-6      x   T6-7         T7-8         T8-9     x    T9-10         T10-11         T11-12         T12-L1         (* ERS = extended/rotated/sidebent; FRS = flexed/rotated/sidebent)  Ribcage Screen:   Appears to have a superiorly flexed first rib on the left and a laterally flex second rib on the right with screening.  Will require further assessment    Cervical Long restrictor tightness: Left greater than right    Levator Scapulae   Splenius Cervicis   Scalenes    Upper Cervical Joint Mobility Testing:   Decreased lateral glide C0-1 both directions ,C2-3 right to left    Lower Cervical Joint Mobility Testing:  Limited lateral glides in flexion and extension at C3-4.  Limited lateral glides left to right C5-6 C6-7 more so in flexion than extension    Palpation: Patient is noted to have significant myofascial tightness through the middle and deep anterior cervical fascia, pharyngeal basilar fascia, scalenes, SCM's, particularly proximal and at the left insertion onto the clavicle.  She has noted to have hypertonicity in the upper traps and levator scapula.  Decreased muscle bulk is noted in the lower trapezius.  She is significantly restricted at the pleural dome on the left, with primary restrictions in the vertebral pleural and the costal pleural ligament.  She has restrictions in the clavipectoral fascia left greater than right, pectoralis minor and major left greater than right.  She is tender to palpation on the left side, upper trapezius,  levator scapula, cervical paraspinals and over restricted vertebral levels as noted above.    Strength: Upper extremity strength testing deferred on this date.  Will require further assessment.    Special tests for cervical/thoracic inlet:  Elevated arm test is positive on the left with decreased speed of hand movement and also upper extremity symptoms after approximately 1 to 1-1/2 minutes.  With Adson's test, she is noted to have a mild decrease in pulse with arm position into extension and horizontal abduction.  She has noted to have upper limb dural tension signs for median, ulnar and radial nerves on the left.    Will require further assessment of the dural system as well as the vascular system related to thoracic outlet symptoms.  She may also require further assessment of the lower body due to regional interdependence and complaints of lower extremity weakness.    Assessment & Plan   CLINICAL IMPRESSIONS  Medical Diagnosis: left arm numbness (R20.0), abnormal electromyogram (EMG) (R94.131) and bilateral hand numbness (R20.0).    Treatment Diagnosis: Impaired mobility and weakness/parethesia Left and R UE   Impression/Assessment: Patient is a 44 year old female with right and left UE numbness, pain, weakness and neck pain complaints as well as intermittent headaches.  The following significant findings have been identified: Pain, Decreased ROM/flexibility, Decreased joint mobility, Decreased strength, Impaired posture, Instability, and myofascial restritions and ribcage restrictions . These impairments interfere with their ability to perform self care tasks, work tasks, recreational activities, and household chores as compared to previous level of function.     Clinical Decision Making (Complexity):  Clinical Presentation: Stable/Uncomplicated  Clinical Presentation Rationale: based on medical and personal factors listed in PT evaluation  Clinical Decision Making (Complexity): Low complexity    PLAN OF  CARE  Treatment Interventions:  Interventions: Manual Therapy, Neuromuscular Re-education, Therapeutic Activity, Therapeutic Exercise    Long Term Goals     PT Goal 1  Goal Identifier: Home exercise  Goal Description: Patient to display independence and compliance with a home exercise program 5 to 7 days/week to assist her with self-management  Target Date: 03/06/25  PT Goal 2  Goal Identifier: Segmental mobility  Goal Description: Patient to display improved and symmetrical segmental mobility through the cervical and thoracic spine to assist her with overhead reach and lifting, repetitive tasks overhead.  Target Date: 03/06/25  PT Goal 3  Goal Identifier: Upper extremity numbness/weakness  Goal Description: Patient did display improved myofascial mobility through the thoracic inlet, neck as well as improved cervical thoracic junction facet mobility and improved first and second rib mobility to allow her to report decreased numbness in both upper extremities by at least 50% as well as improved functional strength to allow her to perform things such as texting with less issue.  Target Date: 03/06/25  PT Goal 4  Goal Identifier: Headaches  Goal Description: Patient to display improve myofascial mobility to allow for symmetrical postural loading at the head to allow her to report decreased frequency of cervicogenic headaches to 1 day/week or less  Target Date: 03/06/25  PT Goal 5  Goal Identifier: Dural tension  Goal Description: Patient to display improve dural mobility to allow her to tolerate upper limb dural tension testing through a full range of ability to carryover into her ability to reach overhead as well as to stand with her hands and her hips without further symptoms.  Target Date: 03/06/25      Frequency of Treatment: 1-2 times per week  Duration of Treatment: 12-24 weeks    Recommended Referrals to Other Professionals:  none    Education Assessment:   Learner/Method: Patient  Education Comments: Mary  findings, plan of care, home exercises    Risks and benefits of evaluation/treatment have been explained.   Patient/Family/caregiver agrees with Plan of Care.     Evaluation Time:     PT Mary, Low Complexity Minutes (58612): 20       Signing Clinician: Carola Ortega PT

## 2024-12-19 ENCOUNTER — THERAPY VISIT (OUTPATIENT)
Dept: PHYSICAL THERAPY | Facility: OTHER | Age: 44
End: 2024-12-19
Attending: FAMILY MEDICINE
Payer: COMMERCIAL

## 2024-12-19 DIAGNOSIS — R20.0 BILATERAL HAND NUMBNESS: ICD-10-CM

## 2024-12-19 DIAGNOSIS — R94.131 ABNORMAL ELECTROMYOGRAM (EMG): ICD-10-CM

## 2024-12-19 DIAGNOSIS — R20.0 LEFT ARM NUMBNESS: Primary | ICD-10-CM

## 2024-12-19 PROCEDURE — 97140 MANUAL THERAPY 1/> REGIONS: CPT | Mod: GP

## 2024-12-19 PROCEDURE — 97112 NEUROMUSCULAR REEDUCATION: CPT | Mod: GP

## 2024-12-26 ENCOUNTER — THERAPY VISIT (OUTPATIENT)
Dept: PHYSICAL THERAPY | Facility: OTHER | Age: 44
End: 2024-12-26
Attending: FAMILY MEDICINE
Payer: COMMERCIAL

## 2024-12-26 DIAGNOSIS — R20.0 LEFT ARM NUMBNESS: Primary | ICD-10-CM

## 2024-12-26 DIAGNOSIS — R94.131 ABNORMAL ELECTROMYOGRAM (EMG): ICD-10-CM

## 2024-12-26 DIAGNOSIS — R20.0 BILATERAL HAND NUMBNESS: ICD-10-CM

## 2024-12-26 PROCEDURE — 97112 NEUROMUSCULAR REEDUCATION: CPT | Mod: GP

## 2024-12-26 PROCEDURE — 97140 MANUAL THERAPY 1/> REGIONS: CPT | Mod: GP

## 2025-01-02 ENCOUNTER — THERAPY VISIT (OUTPATIENT)
Dept: PHYSICAL THERAPY | Facility: OTHER | Age: 45
End: 2025-01-02
Attending: FAMILY MEDICINE
Payer: COMMERCIAL

## 2025-01-02 DIAGNOSIS — R20.0 LEFT ARM NUMBNESS: Primary | ICD-10-CM

## 2025-01-02 DIAGNOSIS — R20.0 BILATERAL HAND NUMBNESS: ICD-10-CM

## 2025-01-02 DIAGNOSIS — R94.131 ABNORMAL ELECTROMYOGRAM (EMG): ICD-10-CM

## 2025-01-02 PROCEDURE — 97140 MANUAL THERAPY 1/> REGIONS: CPT | Mod: GP

## 2025-01-02 PROCEDURE — 97112 NEUROMUSCULAR REEDUCATION: CPT | Mod: GP

## 2025-01-07 NOTE — PROGRESS NOTES
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  Her MRI scan showed left C5-C6 neuroforaminal stenosis and the EMG today confirms left C6 radiculopathy.  I have recommended:    1.  Physical therapy consult to include neck traction  2.  Left C5-C6 epidural injection  3.  She will update me with her progress in 3 months.  His improvement I will repeat another epidural injection.  4.  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  She was started on Effexor and currently takes 75 mg daily and gets 1 episode every other month.  At present I have recommended continuing with Effexor 75 mg daily.  Follow-up in 6 months.    Thank you again for this referral, please feel free to contact me if you have any questions.    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  She had a EMG at an outside facility that was read as normal.  MRI  brain and cervical spine showed changes likely due to her migraine headache but she had mild left neuroforaminal stenosis at C5-C6 that correlated with her symptoms.  Other possibility included thoracic outlet syndrome.  I had recommended a repeat EMG to rule out left C6 radiculopathy and thoracic outlet syndrome.  Also MRI thoracic spine was done that was unremarkable.  X-ray cervical spine was negative for cervical rib.  Lab work included normal B12, MMA, HTLV 1/2, folate and antinuclear antibody.  EMG was consistent with left C6 radiculopathy     PROBLEM LIST   Patient Active Problem List   Diagnosis    Anxiety    Bee sting allergy    Complicated migraine    Dry eye syndrome of both eyes    Gastroesophageal reflux disease without esophagitis    Moderate episode of recurrent major depressive disorder (H)    Myopia of both eyes with regular astigmatism    Seasonal allergic rhinitis due to pollen    Vitreous floaters of both eyes    Rheumatoid factor positive    Left arm numbness    Bilateral hand numbness    Abnormal electromyogram (EMG)         PAST MEDICAL & SURGICAL HISTORY     Past Medical History:   Patient  has a past medical history of Anxiety, Classic migraine with aura, Elevated rheumatoid factor (10/2015), Fatigue, Gastroesophageal reflux disease with esophagitis, Gestational diabetes mellitus, antepartum (02/23/2018), Hypermobility of joint, Seasonal allergic rhinitis, Vitamin D deficiency (08/19/2014), and Vocal cord paralysis (05/25/2018).    Surgical History:  She  has a past surgical history that includes Egd (02/15/2010); Colonoscopy (01/05/2010); Laparoscopy diagnostic (general); wisdom teeth extraction; and upper gi endoscopy (05/11/2021).     SOCIAL HISTORY     Reviewed, and she  reports that she has never smoked. She has been exposed to tobacco smoke. She has never used smokeless tobacco. She reports current alcohol use. She reports that she does not use drugs.     FAMILY HISTORY     Reviewed,  and family history includes Cancer in her paternal grandfather; Cervical Cancer in her maternal aunt; Diabetes in her paternal grandfather; Glaucoma in her maternal grandfather and paternal grandfather; Heart Disease in her maternal grandmother; Hyperlipidemia in her mother; Hypertension in her mother; Hypothyroidism in her mother; Lung Cancer in her paternal grandmother; Migraines in her mother; Myocardial Infarction in her maternal grandmother; No Known Problems in her brother and son; Osteoporosis in her mother; Other - See Comments in her mother; Prostate Cancer in her maternal grandfather; Rheumatic fever in her father; Rheumatoid Arthritis in her maternal aunt.     ALLERGIES     Allergies   Allergen Reactions    Insulin Aspart (Human Analog) Rash     Allergy to suspension not medication    Sulfa Antibiotics Rash     RASH    Amoxicillin-Pot Clavulanate Diarrhea    Insulin Lispro Unknown     Allergy to the suspension not the medication         REVIEW OF SYSTEMS     A 12 point review of system was performed and was negative except as outlined in the history of present illness.     HOME MEDICATIONS     Current Outpatient Rx   Medication Sig Dispense Refill    cholecalciferol 50 MCG (2000 UT) CAPS Take by mouth once daily.      Multiple Vitamin (MULTIVITAMIN ADULT PO) Take by mouth once daily.      naproxen sodium (ANAPROX) 220 MG tablet by mouth. Takes one to two tabs as needed      venlafaxine (EFFEXOR XR) 75 MG 24 hr capsule Take 1 capsule (75 mg) by mouth daily 90 capsule 3         PHYSICAL EXAM     Vital signs  BP 94/66   Pulse 78     Weight:   0 lbs 0 oz    Middle-age female who is alert and oriented x 3 no acute distress. Vital signs are reviewed and documented in electronic medical record. Neck supple. Neurologically speech was normal. Cranial nerves II through XII are intact. Motor strength 5/5. Reflexes 3+ in the left upper extremity 2+ in the right upper extremity. Lower extremity bilaterally 3+ toes  equivocal sensation was intact to light touch pinprick. No dysmetria noted on finger-nose testing heel-knee-shin gait normal Romberg negative. She is able to walk on her toes and heels without any difficulty     PERTINENT DIAGNOSTIC STUDIES     Following studies were reviewed:     XR CERVICAL SPINE 9/19/2024  No acute fracture or subluxation.    MRI THORACIC SPINE 9/24/2024  Negative study. No signal abnormality/enhancement is  present to suggest a demyelinating process.    MRI BRAIN 8/1/2024  1. No acute intracranial abnormality.  2. Mild white matter changes, nonspecific, but can be seen in the  setting of chronic migraine headaches, prior infectious or  inflammatory insults, vasculopathy, or small vessel ischemic disease  or demyelination.  3. Subtle ill-defined linear focus of enhancement in the anterior  right frontal lobe, likely benign developmental venous anomaly.  Consider 6 month follow-up.     MRI CERVICAL SPINE 8/1/2024  No suspicious enhancement in the cervical spinal cord, thecal sac or  vertebrae.     Mild left foraminal narrowing at C5-6 secondary to a foraminal disc  protrusion, otherwise no significant spinal canal or foraminal  narrowing.     MRI BRAIN 7/22/2021  1.  No acute intracranial abnormality. No evidence of acute infarct,   hemorrhage, or mass.     EMG 1/14/2025  This is a abnormal nerve conduction and EMG study of left upper extremity that suggests left C6 radiculopathy.      PERTINENT LABS  Following labs were reviewed:  No visits with results within 3 Month(s) from this visit.   Latest known visit with results is:   Lab on 09/19/2024   Component Date Value Ref Range Status    MAC interpretation 09/19/2024 Negative  Negative Final    Folic Acid 09/19/2024 12.0  4.6 - 34.8 ng/mL Final    HTLV I/II Antibodies by YENIFER 09/19/2024 Negative  Negative Final    Methylmalonic Acid 09/19/2024 0.24  0.00 - 0.40 umol/L Final    Vitamin B12 09/19/2024 674  232 - 1,245 pg/mL Final         Total time  spent for face to face visit, reviewing labs/imaging studies, counseling and coordination of care was: 30 Minutes spent on the date of the encounter doing chart review, review of outside records, review of test results, interpretation of tests, patient visit, and documentation     The longitudinal plan of care for the diagnosis(es)/condition(s) as documented were addressed during this visit. Due to the added complexity in care, I will continue to support Savanna in the subsequent management and with ongoing continuity of care.    This note was dictated using voice recognition software.  Any grammatical or context distortions are unintentional and inherent to the software.    Orders Placed This Encounter   Procedures    XR Cervical/Thoracic Epidural Inj Incl Imaging    Physical Therapy  Referral      New Prescriptions    No medications on file     Modified Medications    No medications on file

## 2025-01-14 ENCOUNTER — OFFICE VISIT (OUTPATIENT)
Dept: NEUROLOGY | Facility: CLINIC | Age: 45
End: 2025-01-14
Payer: COMMERCIAL

## 2025-01-14 ENCOUNTER — OFFICE VISIT (OUTPATIENT)
Dept: NEUROLOGY | Facility: CLINIC | Age: 45
End: 2025-01-14
Attending: PSYCHIATRY & NEUROLOGY
Payer: COMMERCIAL

## 2025-01-14 VITALS — DIASTOLIC BLOOD PRESSURE: 66 MMHG | HEART RATE: 78 BPM | SYSTOLIC BLOOD PRESSURE: 94 MMHG

## 2025-01-14 DIAGNOSIS — M54.12 C6 RADICULOPATHY: ICD-10-CM

## 2025-01-14 DIAGNOSIS — G43.109 COMPLICATED MIGRAINE: Primary | ICD-10-CM

## 2025-01-14 DIAGNOSIS — R20.2 PARESTHESIA: ICD-10-CM

## 2025-01-14 PROCEDURE — 95886 MUSC TEST DONE W/N TEST COMP: CPT | Mod: LT | Performed by: PSYCHIATRY & NEUROLOGY

## 2025-01-14 PROCEDURE — 99213 OFFICE O/P EST LOW 20 MIN: CPT | Performed by: PSYCHIATRY & NEUROLOGY

## 2025-01-14 PROCEDURE — 95909 NRV CNDJ TST 5-6 STUDIES: CPT | Performed by: PSYCHIATRY & NEUROLOGY

## 2025-01-14 PROCEDURE — G2211 COMPLEX E/M VISIT ADD ON: HCPCS | Performed by: PSYCHIATRY & NEUROLOGY

## 2025-01-14 NOTE — PROCEDURES
ELECTROMYOGRAPHY (EMG) REPORT       [facility] NEUROLOGY [place]  [address] [place] [address]  Tel: [phone]  Fax: [phone]  [email or web address]     Savanna Scott,  1980, MRN [id number]  PCP: Lisa Reis  Date: 2025     Principal Diagnosis: C6 radiculopathy     Height: 5 feet 8 inch  Reason for referral: Evaluate left upper. c/o tingling in left hand/fingers, mainly digits I-II > 2 years. Weakness, numbness left arm/hand/fingers > 5 months.       Motor NCS      Nerve / Sites Lat Amp Dist Chele    ms mV cm m/s   L Median - APB      Wrist 3.39 5.6 7       Elbow 7.40 5.6 23 57   L Ulnar - ADM      Wrist 2.50 10.3 7       B.Elbow 5.00 9.2 16.5 66      A.Elbow 7.03 9.3 13 64       F  Wave      Nerve Fmin    ms   L Ulnar - ADM 25.16       Sensory NCS      Nerve / Sites Onset Lat Pk Lat Amp.2-3 Dist Chele Lat Diff    ms ms  V cm m/s ms   L Median - II (Antidr)      Wrist 2.55 3.18 82.9 13 51    L Ulnar - V (Antidr)      Wrist 1.88 2.40 31.1 11 59    L Median, Ulnar - Transcarpal comparison      Median Palm 1.67 2.19 62.7 8 48       Ulnar Palm 1.41 1.88 59.1 8 57          0.31       EMG Summary Table     Spontaneous MUAP Rcmt Note   Muscle Fib PSW Fasc IA # Amp Dur PPP Rate Type   L. Brachioradialis None None None N N N N N N N   L. Pronator teres None None None N N N N N N N   L. Biceps brachii 1+ 1+ None N N N N Poly N N   L. Deltoid 1+ 1+ None N N N N Poly N N   L. Supraspinatus Occ Occ None N N N N N N N   L. Infraspinatus Occ Occ None N N N N N N N   L. Serratus anterior None None None N N N N N N N   L. Triceps brachii None None None N N N N N N N   L. Extensor digitorum communis None None None N N N N N N N   L. Flexor carpi ulnaris None None None N N N N N N N   L. Abductor digiti minimi (manus) None None None N N N N N N N   L. First dorsal interosseous None None None N N N N N N N   L. Abductor pollicis brevis None None None N N N N N N N        Summary: Nerve  conduction and EMG study of left upper extremity shows:  Normal left median distal motor latency, amplitude and conduction velocity.  Normal left ulnar distal motor latency, amplitude and conduction velocity.  Normal left ulnar F latency  Normal left median and ulnar SNAP  Needle exam showed changes as above    Impression:   This is a abnormal nerve conduction and EMG study of left upper extremity that suggests left C6 radiculopathy.      Frank Thakur MD  [facility]      This note was dictated using voice recognition software.  Any grammatical or context distortions are unintentional and inherent to the software.

## 2025-01-14 NOTE — LETTER
2025      Savanna Scott  [address]  [facility] [address]      Dear Colleague,    Thank you for referring your patient, Savanna Scott, to the [facility] NEUROLOGY CLINIC [place]. Please see a copy of my visit note below.    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  Her MRI scan showed left C5-C6 neuroforaminal stenosis and the EMG today confirms left C6 radiculopathy.  I have recommended:    1.  Physical therapy consult to include neck traction  2.  Left C5-C6 epidural injection  3.  She will update me with her progress in 3 months.  His improvement I will repeat another epidural injection.  4.  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  She was started on Effexor and currently takes 75 mg daily and gets 1 episode every other month.  At present I have recommended continuing with Effexor 75 mg daily.  Follow-up in 6 months.    Thank you again for this referral, please feel free to contact me if you have any questions.    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  She had a EMG at an outside facility that was read as normal.  MRI brain and cervical spine showed changes likely due to her migraine headache but she had mild left neuroforaminal stenosis at C5-C6 that correlated with her symptoms.  Other possibility included thoracic outlet syndrome.  I had recommended a repeat EMG to rule out left C6 radiculopathy and thoracic outlet syndrome.  Also MRI thoracic spine was done that was unremarkable.  X-ray cervical spine was negative for cervical rib.  Lab work included normal B12, MMA, HTLV 1/2, folate and antinuclear antibody.  EMG was consistent with left C6 radiculopathy     PROBLEM LIST   Patient Active Problem List   Diagnosis     Anxiety     Bee sting allergy     Complicated migraine     Dry eye syndrome of both eyes     Gastroesophageal reflux disease without esophagitis     Moderate episode of recurrent major depressive disorder (H)     Myopia of both eyes with regular astigmatism     Seasonal allergic rhinitis due to pollen     Vitreous floaters of both eyes     Rheumatoid factor positive     Left arm numbness     Bilateral hand numbness     Abnormal electromyogram (EMG)         PAST MEDICAL & SURGICAL HISTORY     Past Medical History:   Patient  has a past medical history of Anxiety, Classic migraine with aura, Elevated rheumatoid factor (10/2015), Fatigue, Gastroesophageal reflux disease with esophagitis, Gestational diabetes mellitus, antepartum (02/23/2018), Hypermobility of joint, Seasonal allergic rhinitis, Vitamin D deficiency (08/19/2014), and Vocal cord paralysis (05/25/2018).    Surgical History:  She  has a past surgical history that includes Egd (02/15/2010); Colonoscopy (01/05/2010); Laparoscopy diagnostic (general); wisdom teeth extraction; and upper gi endoscopy (05/11/2021).      SOCIAL HISTORY     Reviewed, and she  reports that she has never smoked. She has been exposed to tobacco smoke. She has never used smokeless tobacco. She reports current alcohol use. She reports that she does not use drugs.     FAMILY HISTORY     Reviewed, and family history includes Cancer in her paternal grandfather; Cervical Cancer in her maternal aunt; Diabetes in her paternal grandfather; Glaucoma in her maternal grandfather and paternal grandfather; Heart Disease in her maternal grandmother; Hyperlipidemia in her mother; Hypertension in her mother; Hypothyroidism in her mother; Lung Cancer in her paternal grandmother; Migraines in her mother; Myocardial Infarction in her maternal grandmother; No Known Problems in her brother and son; Osteoporosis in her mother; Other - See Comments in her mother; Prostate Cancer in her maternal grandfather; Rheumatic fever in her father; Rheumatoid Arthritis in her maternal aunt.     ALLERGIES     Allergies   Allergen Reactions     Insulin Aspart (Human Analog) Rash     Allergy to suspension not medication     Sulfa Antibiotics Rash     RASH     Amoxicillin-Pot Clavulanate Diarrhea     Insulin Lispro Unknown     Allergy to the suspension not the medication         REVIEW OF SYSTEMS     A 12 point review of system was performed and was negative except as outlined in the history of present illness.     HOME MEDICATIONS     Current Outpatient Rx   Medication Sig Dispense Refill     cholecalciferol 50 MCG (2000 UT) CAPS Take by mouth once daily.       Multiple Vitamin (MULTIVITAMIN ADULT PO) Take by mouth once daily.       naproxen sodium (ANAPROX) 220 MG tablet by mouth. Takes one to two tabs as needed       venlafaxine (EFFEXOR XR) 75 MG 24 hr capsule Take 1 capsule (75 mg) by mouth daily 90 capsule 3         PHYSICAL EXAM     Vital signs  BP 94/66   Pulse 78     Weight:   0 lbs 0 oz    Middle-age female who is alert and oriented x 3 no acute distress. Vital signs are  reviewed and documented in electronic medical record. Neck supple. Neurologically speech was normal. Cranial nerves II through XII are intact. Motor strength 5/5. Reflexes 3+ in the left upper extremity 2+ in the right upper extremity. Lower extremity bilaterally 3+ toes equivocal sensation was intact to light touch pinprick. No dysmetria noted on finger-nose testing heel-knee-shin gait normal Romberg negative. She is able to walk on her toes and heels without any difficulty     PERTINENT DIAGNOSTIC STUDIES     Following studies were reviewed:     XR CERVICAL SPINE 9/19/2024  No acute fracture or subluxation.    MRI THORACIC SPINE 9/24/2024  Negative study. No signal abnormality/enhancement is  present to suggest a demyelinating process.    MRI BRAIN 8/1/2024  1. No acute intracranial abnormality.  2. Mild white matter changes, nonspecific, but can be seen in the  setting of chronic migraine headaches, prior infectious or  inflammatory insults, vasculopathy, or small vessel ischemic disease  or demyelination.  3. Subtle ill-defined linear focus of enhancement in the anterior  right frontal lobe, likely benign developmental venous anomaly.  Consider 6 month follow-up.     MRI CERVICAL SPINE 8/1/2024  No suspicious enhancement in the cervical spinal cord, thecal sac or  vertebrae.     Mild left foraminal narrowing at C5-6 secondary to a foraminal disc  protrusion, otherwise no significant spinal canal or foraminal  narrowing.     MRI BRAIN 7/22/2021  1.  No acute intracranial abnormality. No evidence of acute infarct,   hemorrhage, or mass.     EMG 1/14/2025  This is a abnormal nerve conduction and EMG study of left upper extremity that suggests left C6 radiculopathy.      PERTINENT LABS  Following labs were reviewed:  No visits with results within 3 Month(s) from this visit.   Latest known visit with results is:   Lab on 09/19/2024   Component Date Value Ref Range Status     MAC interpretation 09/19/2024 Negative   Negative Final     Folic Acid 09/19/2024 12.0  4.6 - 34.8 ng/mL Final     HTLV I/II Antibodies by YENIFER 09/19/2024 Negative  Negative Final     Methylmalonic Acid 09/19/2024 0.24  0.00 - 0.40 umol/L Final     Vitamin B12 09/19/2024 674  232 - 1,245 pg/mL Final         Total time spent for face to face visit, reviewing labs/imaging studies, counseling and coordination of care was: 30 Minutes spent on the date of the encounter doing chart review, review of outside records, review of test results, interpretation of tests, patient visit, and documentation     The longitudinal plan of care for the diagnosis(es)/condition(s) as documented were addressed during this visit. Due to the added complexity in care, I will continue to support Savanna in the subsequent management and with ongoing continuity of care.    This note was dictated using voice recognition software.  Any grammatical or context distortions are unintentional and inherent to the software.    Orders Placed This Encounter   Procedures     XR Cervical/Thoracic Epidural Inj Incl Imaging     Physical Therapy  Referral      New Prescriptions    No medications on file     Modified Medications    No medications on file                 Again, thank you for allowing me to participate in the care of your patient.        Sincerely,        Frank Thakur MD    Electronically signed

## 2025-01-14 NOTE — NURSING NOTE
"Savanna Scott is a 44 year old female who presents for:  Chief Complaint   Patient presents with    RECHECK     EMG review  L foot still feel weak. Slight increase of numbness/tingling in both hands.         Initial Vitals:  BP 94/66   Pulse 78  Estimated body mass index is 23.87 kg/m  as calculated from the following:    Height as of 9/16/24: 1.727 m (5' 8\").    Weight as of 9/16/24: 71.2 kg (157 lb).. There is no height or weight on file to calculate BSA. BP completed using cuff size: wrist cuff    Nicolás Quiles  "

## 2025-01-14 NOTE — LETTER
1/14/2025      Savanna Scott  [address]  [facility] [facility] [address]      Dear Colleague,    Thank you for referring your patient, Savanna Scott, to the [facility] NEUROLOGY CLINIC [place]. Please see a copy of my visit note below.    See procedure note for EMG report      Again, thank you for allowing me to participate in the care of your patient.        Sincerely,        Frank Thakur MD    Electronically signed

## 2025-01-16 ENCOUNTER — THERAPY VISIT (OUTPATIENT)
Dept: PHYSICAL THERAPY | Facility: OTHER | Age: 45
End: 2025-01-16
Attending: FAMILY MEDICINE
Payer: COMMERCIAL

## 2025-01-16 DIAGNOSIS — R94.131 ABNORMAL ELECTROMYOGRAM (EMG): ICD-10-CM

## 2025-01-16 DIAGNOSIS — R20.0 LEFT ARM NUMBNESS: Primary | ICD-10-CM

## 2025-01-16 DIAGNOSIS — R20.0 BILATERAL HAND NUMBNESS: ICD-10-CM

## 2025-01-16 PROCEDURE — 97140 MANUAL THERAPY 1/> REGIONS: CPT | Mod: GP

## 2025-01-16 PROCEDURE — 97112 NEUROMUSCULAR REEDUCATION: CPT | Mod: GP

## 2025-01-16 PROCEDURE — 97110 THERAPEUTIC EXERCISES: CPT | Mod: GP

## 2025-01-16 NOTE — PROGRESS NOTES
Physical Therapy Progress Note  1/16/2025        PLAN  Continue per POC, adding manual and home traction to POC.    Beginning/End Dates of Progress Note Reporting Period:  12/12/24 to 01/16/2025    Referring Provider:  Lisa Reis       01/16/25 0500   Appointment Info   Signing clinician's name / credentials Carola Ortega PT   Total/Authorized Visits No visit limits   Visits Used 5   Medical Diagnosis left arm numbness (R20.0), abnormal electromyogram (EMG) (R94.131) and bilateral hand numbness (R20.0).   PT Tx Diagnosis Impaired mobility and weakness/parethesia Left and R UE   Progress Note/Certification   Onset of illness/injury or Date of Surgery 10/11/24  (using referral date: headaches have been longstanding. Arm symptoms began in 2023.)   Therapy Frequency 1-2 times per week   Predicted Duration 12-24 weeks   Progress Note Completed Date 12/12/24   GOALS   PT Goals 2;3;4;5   PT Goal 1   Goal Identifier Home exercise   Goal Description Patient to display independence and compliance with a home exercise program 5 to 7 days/week to assist her with self-management   Goal Progress In progress--continue to develop. Good compliance reported   Target Date 03/06/25   PT Goal 2   Goal Identifier Segmental mobility   Goal Description Patient to display improved and symmetrical segmental mobility through the cervical and thoracic spine to assist her with overhead reach and lifting, repetitive tasks overhead.   Goal Progress In progress.   Target Date 03/06/25   PT Goal 3   Goal Identifier Upper extremity numbness/weakness   Goal Description Patient to display improved myofascial mobility through the thoracic inlet, neck as well as improved cervical thoracic junction facet mobility and improved first and second rib mobility to allow her to report decreased numbness in both upper extremities by at least 50% as well as improved functional strength to allow her to perform things such as texting with less issue.   Goal  Progress In progress with continued myofascial tightness noted   Target Date 03/06/25   PT Goal 4   Goal Identifier Headaches   Goal Description Patient to display improve myofascial mobility to allow for symmetrical postural loading at the head to allow her to report decreased frequency of cervicogenic headaches to 1 day/week or less   Goal Progress No severe headaches noted --will continue to monitor   Target Date 03/06/25   PT Goal 5   Goal Identifier Dural tension   Goal Description Patient to display improve dural mobility to allow her to tolerate upper limb dural tension testing through a full range of ability to carryover into her ability to reach overhead as well as to stand with her hands and her hips without further symptoms.   Goal Progress not directly addressed as of yet   Target Date 03/06/25   Subjective Report   Subjective Report Notes that she had her repeat EMG and saw Ofe Alarcon for a neurology follow up. EMG confirmed a C6 radiculopathy, also supporting the MRI findings of C56 neuroformaminal stenosis at C56 L. New PT orders noted in her chart to include traction which can be incorporated into her current POC.She notes he also recommended an epidural injection at C56 L but she may pursue oral steroids first.   Objective Measures   Objective Measures Objective Measure 1;Objective Measure 2;Objective Measure 3;Objective Measure 4   Objective Measure 1   Objective Measure Postural loading   Details General listening to the L lower cervical/upper thoracic. Postural loading--hypermobile/unstable in general with loading. Head: L/L, R/L and L/R. Shoulders L/L, pelvis L/L   Objective Measure 2   Objective Measure Myofascial   Details Local listening to the lower cervical/CT junction/pleural dome L. Tight in the R anterior lower cervical and in the CT junction. Restriction noted in the deep anterior cervical fascia L>R lower. Restricted at the left pleural dome: costo and vertebropleural ligaments as well  as the deep upper thoracic fascia.   Objective Measure 3   Objective Measure Segmental mobility/rib mobility   Details Neural irritability noted at C56, C67 L>R. Thoracic mobility not tested   Objective Measure 4   Objective Measure Cranial/dural   Details not tested   Treatment Interventions (PT)   Interventions Therapeutic Procedure/Exercise;Neuromuscular Re-education;Manual Therapy   Therapeutic Procedure/Exercise   Therapeutic Procedures: strength, endurance, ROM, flexibility minutes (07977) 10   Ther Proc 1 Therapeutic exercise   Ther Proc 1 - Details instruct and trial use of Mcneil Brownell Cervical Traction Unit with pull to 15-18 lbs; instructed in use of static or intermittent tractions. Pt will use at home daily.   Skilled Intervention To promote improved posture, strength, soft tissue flexibility, joint mobility, and pain/numbness control   Neuromuscular Re-education   Neuromuscular re-ed of mvmt, balance, coord, kinesthetic sense, posture, proprioception minutes (86720) 15   Neuromuscular Re-education Neuro Re-ed 2;Neuro Re-ed 3   Neuro Re-ed 1 Induction   Neuro Re-ed 1 - Details Induction released to [employer] pleural and vertebral pleural ligaments on the left   Neuro Re-ed 2 MET   Skilled Intervention To promote improved posture, myofascial mobility, joint mobility, symptom management.   Patient Response/Progress Improved at CT junction. Remains hard to isolate MET due to hypermobility   Neuro Re-ed 3 neural manipulation C56, C67 R and L   Manual Therapy   Manual Therapy Manual Therapy 2;Manual Therapy 3   Manual Therapy 1 MFR/OSFM   Manual Therapy 1 - Details Myofascial release to the middle and deep anterior cervical fascia, scalenes, pharyngobasilar fascia, c, localized release to CT junction. Release to costo and transverse pleural ligaments.   Manual Therapy 2 Joint mobilization   Skilled Intervention To promote improved myofascial and joint mobility, symptom management   Patient Response/Progress  Tolerated well--traction felt good but moreso with stretch to the anterolateral neck soft tissues.   Manual Therapy 3 Manual traction   Manual Therapy 3 - Details supine manual cervical intermittent traction in slight flexion   Education   Learner/Method Patient   Education Comments Eval findings, plan of care, home exercises   Plan   Home program Scalene stretches, SCM stretches, pectoral stretches--change to clavipectoral stretch in door, anterior cervical fascia self stretch, median and ulnar nerve self mobilization. Croatian with UEs and LEs--progress towel roll. Home cervical traction 15-18 lbs--either static or intermittent daily.   Updates to plan of care Continue per POC, adding manual and home traction to POC.   Plan for next session Continue assessment.  Manual therapy, neuromuscular reeducation, therapeutic exercise and home program   Total Session Time   Timed Code Treatment Minutes 25   Total Treatment Time (sum of timed and untimed services) 25   Carola Ortega, PT on 1/16/2025 at 1:24 PM

## 2025-01-30 ENCOUNTER — THERAPY VISIT (OUTPATIENT)
Dept: PHYSICAL THERAPY | Facility: OTHER | Age: 45
End: 2025-01-30
Attending: FAMILY MEDICINE
Payer: COMMERCIAL

## 2025-01-30 DIAGNOSIS — R20.0 LEFT ARM NUMBNESS: Primary | ICD-10-CM

## 2025-01-30 DIAGNOSIS — R94.131 ABNORMAL ELECTROMYOGRAM (EMG): ICD-10-CM

## 2025-01-30 DIAGNOSIS — R20.0 BILATERAL HAND NUMBNESS: ICD-10-CM

## 2025-01-30 PROCEDURE — 97112 NEUROMUSCULAR REEDUCATION: CPT | Mod: GP

## 2025-01-30 PROCEDURE — 97140 MANUAL THERAPY 1/> REGIONS: CPT | Mod: GP

## 2025-02-27 ENCOUNTER — THERAPY VISIT (OUTPATIENT)
Dept: PHYSICAL THERAPY | Facility: OTHER | Age: 45
End: 2025-02-27
Attending: FAMILY MEDICINE
Payer: COMMERCIAL

## 2025-02-27 DIAGNOSIS — R20.0 BILATERAL HAND NUMBNESS: ICD-10-CM

## 2025-02-27 DIAGNOSIS — R20.0 LEFT ARM NUMBNESS: Primary | ICD-10-CM

## 2025-02-27 DIAGNOSIS — R94.131 ABNORMAL ELECTROMYOGRAM (EMG): ICD-10-CM

## 2025-02-27 PROCEDURE — 97140 MANUAL THERAPY 1/> REGIONS: CPT | Mod: GP

## 2025-02-27 PROCEDURE — 97112 NEUROMUSCULAR REEDUCATION: CPT | Mod: GP

## 2025-03-05 ENCOUNTER — MYC MEDICAL ADVICE (OUTPATIENT)
Dept: FAMILY MEDICINE | Facility: OTHER | Age: 45
End: 2025-03-05

## 2025-03-05 ENCOUNTER — OFFICE VISIT (OUTPATIENT)
Dept: FAMILY MEDICINE | Facility: OTHER | Age: 45
End: 2025-03-05
Attending: FAMILY MEDICINE
Payer: COMMERCIAL

## 2025-03-05 VITALS
HEART RATE: 80 BPM | WEIGHT: 156.4 LBS | OXYGEN SATURATION: 98 % | SYSTOLIC BLOOD PRESSURE: 104 MMHG | DIASTOLIC BLOOD PRESSURE: 66 MMHG | TEMPERATURE: 97.6 F | RESPIRATION RATE: 16 BRPM | BODY MASS INDEX: 23.78 KG/M2

## 2025-03-05 DIAGNOSIS — L98.9 SKIN LESION: Primary | ICD-10-CM

## 2025-03-05 ASSESSMENT — ANXIETY QUESTIONNAIRES
3. WORRYING TOO MUCH ABOUT DIFFERENT THINGS: SEVERAL DAYS
8. IF YOU CHECKED OFF ANY PROBLEMS, HOW DIFFICULT HAVE THESE MADE IT FOR YOU TO DO YOUR WORK, TAKE CARE OF THINGS AT HOME, OR GET ALONG WITH OTHER PEOPLE?: SOMEWHAT DIFFICULT
7. FEELING AFRAID AS IF SOMETHING AWFUL MIGHT HAPPEN: NOT AT ALL
4. TROUBLE RELAXING: SEVERAL DAYS
2. NOT BEING ABLE TO STOP OR CONTROL WORRYING: NOT AT ALL
GAD7 TOTAL SCORE: 3
1. FEELING NERVOUS, ANXIOUS, OR ON EDGE: NOT AT ALL
GAD7 TOTAL SCORE: 3
IF YOU CHECKED OFF ANY PROBLEMS ON THIS QUESTIONNAIRE, HOW DIFFICULT HAVE THESE PROBLEMS MADE IT FOR YOU TO DO YOUR WORK, TAKE CARE OF THINGS AT HOME, OR GET ALONG WITH OTHER PEOPLE: SOMEWHAT DIFFICULT
5. BEING SO RESTLESS THAT IT IS HARD TO SIT STILL: NOT AT ALL
6. BECOMING EASILY ANNOYED OR IRRITABLE: SEVERAL DAYS
GAD7 TOTAL SCORE: 3
7. FEELING AFRAID AS IF SOMETHING AWFUL MIGHT HAPPEN: NOT AT ALL

## 2025-03-05 ASSESSMENT — PATIENT HEALTH QUESTIONNAIRE - PHQ9
10. IF YOU CHECKED OFF ANY PROBLEMS, HOW DIFFICULT HAVE THESE PROBLEMS MADE IT FOR YOU TO DO YOUR WORK, TAKE CARE OF THINGS AT HOME, OR GET ALONG WITH OTHER PEOPLE: NOT DIFFICULT AT ALL
SUM OF ALL RESPONSES TO PHQ QUESTIONS 1-9: 2
SUM OF ALL RESPONSES TO PHQ QUESTIONS 1-9: 2

## 2025-03-05 ASSESSMENT — PAIN SCALES - GENERAL: PAINLEVEL_OUTOF10: NO PAIN (0)

## 2025-03-05 NOTE — PROGRESS NOTES
Assessment & Plan       ICD-10-CM    1. Skin lesion  L98.9 Surgical Pathology Exam        Reviewed with patient differential diagnosis, appearance seems most consistent with a seborrheic keratosis or a pyogenic granuloma.  Further recommendations based on pathology.        No follow-ups on file.    Subjective   Savanna is a 44 year old, presenting for the following health issues:  Derm Problem (Here for mole on left shoulder that is itchy, and gets caught on bra strap. /)        3/5/2025    11:15 AM   Additional Questions   Roomed by Gabriela   Accompanied by self     History of Present Illness       Reason for visit:  Mole painful and new  Symptom onset:  More than a month  Symptoms include:  Bothersome mole  Symptom intensity:  Mild  Symptom progression:  Staying the same  Had these symptoms before:  No   She is taking medications regularly.        Patient has a skin lesion on her left shoulder that just appeared within the last couple of weeks.  It is raised, scaly and itchy.  It is right under her bra strap, gets easily irritated.  Today when she was in the shower, she noticed that it was bleeding.  She would like it removed, due to the location and recurrent bleeding.          Objective    /66 (BP Location: Right arm, Patient Position: Sitting, Cuff Size: Adult Regular)   Pulse 80   Temp 97.6  F (36.4  C) (Tympanic)   Resp 16   Wt 70.9 kg (156 lb 6.4 oz)   LMP 02/14/2025 (Approximate)   SpO2 98%   BMI 23.78 kg/m    Body mass index is 23.78 kg/m .  Physical Exam  Constitutional:       Appearance: She is well-developed.   HENT:      Right Ear: External ear normal.      Left Ear: External ear normal.   Eyes:      General: No scleral icterus.     Conjunctiva/sclera: Conjunctivae normal.   Cardiovascular:      Rate and Rhythm: Normal rate.   Pulmonary:      Effort: Pulmonary effort is normal. No respiratory distress.   Skin:     Findings: No rash.      Comments: 5 mm raised scaly pink lesion on left  shoulder with scabbing and inflammation.   Neurological:      Mental Status: She is alert.          Area is numbed with lidocaine with epinephrine, 3 mL and then sterilized with ChloraPrep.  After ensuring appropriate anesthesia, lesion is removed via shave biopsy.  Silver nitrate is used for hemostasis.  Bandages applied.         Signed Electronically by: Lisa Reis MD

## 2025-03-05 NOTE — NURSING NOTE
"Chief Complaint   Patient presents with    Derm Problem     Here for mole on left shoulder that is itchy, and gets caught on bra strap.          Initial /66 (BP Location: Right arm, Patient Position: Sitting, Cuff Size: Adult Regular)   Pulse 80   Temp 97.6  F (36.4  C) (Tympanic)   Resp 16   Wt 70.9 kg (156 lb 6.4 oz)   LMP 02/14/2025 (Approximate)   SpO2 98%   BMI 23.78 kg/m   Estimated body mass index is 23.78 kg/m  as calculated from the following:    Height as of 9/16/24: 1.727 m (5' 8\").    Weight as of this encounter: 70.9 kg (156 lb 6.4 oz).  Medication Reconciliation: complete    Gabriela Martinez LPN    Advance Care Directive reviewed    "

## 2025-03-06 NOTE — TELEPHONE ENCOUNTER
Has allergy to Sulfa. Became itchy every where. Wondering if its an allergic reaction to the silver nitrate used. Took Zyrtec and it helped.     Seen on 3/5 for skin lesion. Silver nitrate used for hemostasis.     Routing to provider to review and respond.  Avis Gasca RN on 3/6/2025 at 7:54 AM

## 2025-03-10 LAB
PATH REPORT.COMMENTS IMP SPEC: NORMAL
PATH REPORT.FINAL DX SPEC: NORMAL
PHOTO IMAGE: NORMAL

## 2025-04-02 ENCOUNTER — THERAPY VISIT (OUTPATIENT)
Dept: PHYSICAL THERAPY | Facility: OTHER | Age: 45
End: 2025-04-02
Attending: FAMILY MEDICINE
Payer: COMMERCIAL

## 2025-04-02 DIAGNOSIS — R94.131 ABNORMAL ELECTROMYOGRAM (EMG): ICD-10-CM

## 2025-04-02 DIAGNOSIS — R20.0 LEFT ARM NUMBNESS: Primary | ICD-10-CM

## 2025-04-02 DIAGNOSIS — R20.0 BILATERAL HAND NUMBNESS: ICD-10-CM

## 2025-04-02 PROCEDURE — 97140 MANUAL THERAPY 1/> REGIONS: CPT | Mod: GP

## 2025-04-02 PROCEDURE — 97112 NEUROMUSCULAR REEDUCATION: CPT | Mod: GP

## 2025-04-02 NOTE — PROGRESS NOTES
Physical Therapy Progress Note  4/2/2025        PLAN  Continue per POC: 1 time per week as scheduling allows: manual therapy,N-M re-ed, therapeutic exercise and HEP. Pt did complete trial of home cervical traction. Will continue manual traction as indicated.    Beginning/End Dates of Progress Note Reporting Period:  12/12/24 to 04/02/2025    Referring Provider:  Lisa Reis       04/02/25 0500   Appointment Info   Signing clinician's name / credentials Carola Ortega PT   Total/Authorized Visits No visit limits   Visits Used 8   Medical Diagnosis left arm numbness (R20.0), abnormal electromyogram (EMG) (R94.131) and bilateral hand numbness (R20.0).   PT Tx Diagnosis Impaired mobility and weakness/parethesia Left and R UE   Progress Note/Certification   Onset of illness/injury or Date of Surgery 10/11/24  (using referral date: headaches have been longstanding. Arm symptoms began in 2023.)   Therapy Frequency 1-2 times per week   Predicted Duration 12-24 weeks   Progress Note Due Date 05/20/25   Progress Note Completed Date 12/12/24   GOALS   PT Goals 2;3;4;5   PT Goal 1   Goal Identifier Home exercise   Goal Description Patient to display independence and compliance with a home exercise program 5 to 7 days/week to assist her with self-management   Goal Progress In progress--Reports limited compliance reported due to other committments, building their house, family stresses.   Target Date 05/20/25   PT Goal 2   Goal Identifier Segmental mobility   Goal Description Patient to display improved and symmetrical segmental mobility through the cervical and thoracic spine to assist her with overhead reach and lifting, repetitive tasks overhead.   Goal Progress In progress. More restrictions today lower cervical/upper thoracic and in the lower thoracic spine.   Target Date 05/20/25   PT Goal 3   Goal Identifier Upper extremity numbness/weakness   Goal Description Patient to display improved myofascial mobility through the  thoracic inlet, neck as well as improved cervical thoracic junction facet mobility and improved first and second rib mobility to allow her to report decreased numbness in both upper extremities by at least 50% as well as improved functional strength to allow her to perform things such as texting with less issue.   Goal Progress Not changed functionally. Noted to have poor first rib mobility L. Significant tightness through the soft tissues of the thoracic inlet.   Target Date 05/20/25   PT Goal 4   Goal Identifier Headaches   Goal Description Patient to display improve myofascial mobility to allow for symmetrical postural loading at the head to allow her to report decreased frequency of cervicogenic headaches to 1 day/week or less   Goal Progress No severe headaches reported.   Target Date 03/06/25   Date Met 04/02/25   PT Goal 5   Goal Identifier Dural tension   Goal Description Patient to display improve dural mobility to allow her to tolerate upper limb dural tension testing through a full range of ability to carryover into her ability to reach overhead as well as to stand with her hands and her hips without further symptoms.   Goal Progress Assessment deferred today: not directly addressed as of yet   Target Date 05/20/25   Subjective Report   Subjective Report Pt has not been seen in PT since 2-27-25. Today is her 8th visit.  Life has been very busy and she just did not get anything scheduled. Notes intermittent symptoms down the arm dependent on activity.  Reports she is much more tight in the front of the neck, locked up in the lower neck and thoracic spine. No change inthe prickly feeling, numbness in the hand or in hand strength. Hand cramps up easily Pushing a door open is really hard. Prednisone helped calm everything down.  Has been abusing things, working on her house such as hanging sheet rock. Limited compliance with HEP.   Objective Measures   Objective Measures Objective Measure 1;Objective Measure  2;Objective Measure 3;Objective Measure 4   Objective Measure 1   Objective Measure Postural loading   Details General listening to the L lower cervical/upper thoracic with extended listening to the central lower thoracic region posteriorly. Postural loading--hypermobile/unstable in general with loading so hard to isolate; painful with loading the L side. . Head: L/L, R/L and L/R. Shoulders L/L, pelvis L/L   Objective Measure 2   Objective Measure Myofascial   Details Local listening to the lower cervical/CT junction/pleural dome L with extended listening to posterior C5.  Tight in the R anterior lower cervical and in the CT junction. Restriction noted in the deep anterior cervical fascia L>R lower. Restricted at the left pleural dome: costo and vertebropleural ligaments as well as the deep upper thoracic fascia. Tight in the pectorals, clavipectoral fascia, axillary fascia L>R   Objective Measure 3   Objective Measure Segmental mobility/rib mobility   Details First rib superiorly subluxed L. Limited extension upper thoracic with FRS R Noted C7T1. FRS L T89 R T910, R T10-11,   Objective Measure 4   Objective Measure Cranial/dural   Details not tested   Treatment Interventions (PT)   Interventions Therapeutic Procedure/Exercise;Neuromuscular Re-education;Manual Therapy   Therapeutic Procedure/Exercise   Ther Proc 1 Therapeutic exercise   Ther Proc 1 - Details Continue HEP--try to improve compliance   Skilled Intervention To promote improved posture, strength, soft tissue flexibility, joint mobility, and pain/numbness control   Patient Response/Progress no charge   Neuromuscular Re-education   Neuromuscular re-ed of mvmt, balance, coord, kinesthetic sense, posture, proprioception minutes ([id number]) 20   Neuromuscular Re-education Neuro Re-ed 2;Neuro Re-ed 3   Neuro Re-ed 1 Induction   Neuro Re-ed 1 - Details Induction released to [employer] pleural and vertebral pleural ligaments on the left. Indirect release to L first rib    Neuro Re-ed 2 MET   Neuro Re-ed 2 - Details FRS L T89 R T910, R T10-11,   Neuro Re-ed 3 neural manipulation   Skilled Intervention To promote improved posture, myofascial mobility, joint mobility, symptom management.   Patient Response/Progress Improved at CT junction. Still decreased motion but improved in the lower thoracic region.   Manual Therapy   Manual Therapy: Mobilization, MFR, MLD, friction massage minutes ([id number]) 30   Manual Therapy Manual Therapy 2;Manual Therapy 3   Manual Therapy 1 MFR/OSFM   Manual Therapy 1 - Details Myofascial release to the middle and deep anterior cervical fascia, scalenes, pharyngobasilar fascia,  localized release to CT junction.. Inferior loading/necklace technique upper thoracic.   Manual Therapy 2 Joint mobilization   Manual Therapy 2 - Details CT junction, upper thoracic extension mobs supine   Manual Therapy 3 Manual traction   Skilled Intervention To promote improved myofascial and joint mobility, symptom management   Patient Response/Progress More tight through thoracic inlet region.   Education   Learner/Method Patient   Education Comments Eval findings, plan of care, home exercises   Plan   Home program Scalene stretches, SCM stretches, pectoral stretches--change to clavipectoral stretch in door, anterior cervical fascia self stretch, median and ulnar nerve self mobilization. Tristanian with UEs and LEs--progress towel roll. Home cervical traction 15-18 lbs--either static or intermittent daily. Supine extension stretch head over edge of table.   Updates to plan of care Continue per POC: 1 time per week as scheduling allows: manual therapy,N-M re-ed, therapeutic exercise and HEP. Pt did complete trial of home cervical traction. Will continue manual traction as indicated.   Plan for next session Continue assessment.  Manual therapy, neuromuscular reeducation, therapeutic exercise and home program   Total Session Time   Timed Code Treatment Minutes 50   Total Treatment  Time (sum of timed and untimed services) 50   Carola Ortega, PT on 4/2/2025 at 12:29 PM

## 2025-05-01 ENCOUNTER — THERAPY VISIT (OUTPATIENT)
Dept: PHYSICAL THERAPY | Facility: OTHER | Age: 45
End: 2025-05-01
Attending: FAMILY MEDICINE
Payer: COMMERCIAL

## 2025-05-01 DIAGNOSIS — R20.0 LEFT ARM NUMBNESS: Primary | ICD-10-CM

## 2025-05-01 DIAGNOSIS — R94.131 ABNORMAL ELECTROMYOGRAM (EMG): ICD-10-CM

## 2025-05-01 DIAGNOSIS — R20.0 BILATERAL HAND NUMBNESS: ICD-10-CM

## 2025-05-01 PROCEDURE — 97140 MANUAL THERAPY 1/> REGIONS: CPT | Mod: GP

## 2025-05-01 PROCEDURE — 97112 NEUROMUSCULAR REEDUCATION: CPT | Mod: GP

## 2025-05-22 ENCOUNTER — PATIENT OUTREACH (OUTPATIENT)
Dept: CARE COORDINATION | Facility: CLINIC | Age: 45
End: 2025-05-22
Payer: COMMERCIAL

## 2025-06-03 ENCOUNTER — OFFICE VISIT (OUTPATIENT)
Dept: FAMILY MEDICINE | Facility: OTHER | Age: 45
End: 2025-06-03
Payer: COMMERCIAL

## 2025-06-03 VITALS
TEMPERATURE: 97.3 F | BODY MASS INDEX: 23.87 KG/M2 | SYSTOLIC BLOOD PRESSURE: 122 MMHG | WEIGHT: 157 LBS | RESPIRATION RATE: 18 BRPM | DIASTOLIC BLOOD PRESSURE: 72 MMHG | HEART RATE: 84 BPM | OXYGEN SATURATION: 98 %

## 2025-06-03 DIAGNOSIS — B37.31 CANDIDIASIS OF VAGINA: ICD-10-CM

## 2025-06-03 DIAGNOSIS — H66.91 ACUTE OTITIS MEDIA, RIGHT: Primary | ICD-10-CM

## 2025-06-03 DIAGNOSIS — R09.81 NASAL CONGESTION: ICD-10-CM

## 2025-06-03 DIAGNOSIS — J34.89 SINUS PRESSURE: ICD-10-CM

## 2025-06-03 DIAGNOSIS — H92.01 OTALGIA, RIGHT: ICD-10-CM

## 2025-06-03 RX ORDER — FLUCONAZOLE 150 MG/1
150 TABLET ORAL
Qty: 3 TABLET | Refills: 0 | Status: SHIPPED | OUTPATIENT
Start: 2025-06-03 | End: 2025-06-10

## 2025-06-03 RX ORDER — CEFDINIR 300 MG/1
300 CAPSULE ORAL 2 TIMES DAILY
Qty: 14 CAPSULE | Refills: 0 | Status: SHIPPED | OUTPATIENT
Start: 2025-06-03 | End: 2025-06-10

## 2025-06-03 ASSESSMENT — ENCOUNTER SYMPTOMS
DIARRHEA: 0
FEVER: 0
SINUS PRESSURE: 1
NAUSEA: 0
SHORTNESS OF BREATH: 0
COUGH: 1
CHILLS: 0
SORE THROAT: 0
VOMITING: 0
WHEEZING: 0
CARDIOVASCULAR NEGATIVE: 1

## 2025-06-03 ASSESSMENT — PAIN SCALES - GENERAL: PAINLEVEL_OUTOF10: MODERATE PAIN (6)

## 2025-06-03 NOTE — NURSING NOTE
"Chief Complaint   Patient presents with    Sinus Problem     Patient is here for ear pain x 1 week, patient is having yellow, green drainage.  Initial /72   Pulse 84   Temp 97.3  F (36.3  C)   Resp 18   Wt 71.2 kg (157 lb)   SpO2 98%   BMI 23.87 kg/m   Estimated body mass index is 23.87 kg/m  as calculated from the following:    Height as of 9/16/24: 1.727 m (5' 8\").    Weight as of this encounter: 71.2 kg (157 lb).  Medication Reconciliation: complete        Erika Lopes LPN    "

## 2025-06-03 NOTE — PROGRESS NOTES
Savanna Scott  1980    ASSESSMENT/PLAN      1. Acute otitis media, right (Primary)  - cefdinir (OMNICEF) 300 MG capsule; Take 1 capsule (300 mg) by mouth 2 times daily for 7 days.  Dispense: 14 capsule; Refill: 0  2. Otalgia, right  3. Sinus pressure  4. Nasal congestion  5. Candidiasis of vagina  - fluconazole (DIFLUCAN) 150 MG tablet; Take 1 tablet (150 mg) by mouth every 3 days for 3 doses.  Dispense: 3 tablet; Refill: 0      - Cefdinir 300 mg twice daily for 7 days provided for acute otitis media of right ear.  - Diflucan 150 mg tablets provided for vaginal candidiasis following antibiotic use.   - Recommend Claritin and Flonase daily for sinus pressure and congestion.  - May use over-the-counter Tylenol or ibuprofen PRN  - Follow up as needed for new or worsening symptoms          *Explanation of diagnosis, treatment options and risk and benefits of medications reviewed with patient. Patient agrees with plan of care.  *All questions were answered.    *Red flags symptoms were discussed and patient was advised when they should return for reevaluation or for prompt emergency evaluation.   *Patient was given verbal and written instructions on plan of care. Instructions were printed or are available on [employer] on electronic AVS.   *We discussed potential side effects of any prescribed or recommended therapies, as well as expectations for response to treatments.  *Patient discharged in stable condition    Josh Rodriguez, JUAN, APRN, FNP-C  [facility]    SUBJECTIVE  CHIEF COMPLAINT/ REASON FOR VISIT  Patient presents with:  Sinus Problem     HISTORY OF PRESENT ILLNESS  Savanna Scott is a pleasant 44 year old female presents to [facility] today with ear pain and sinus concerns.  Approximately 2 weeks ago patient had a viral URI.  Those symptoms have resolved however now she continues to have sinus pressure and right ear pain.  She does continue to have a cough but this has  improved.  She has been treating with over-the-counter Sudafed, Mucinex, and ibuprofen.  She does endorse some spring allergies but has not been taking any daily allergy medication.  She denies any tinnitus, but does have reduced hearing in the right side along with the pain.  She did have someone look at the ear recently and it was noted to have redness but no bulging.  She denies any fever, nausea, vomiting or diarrhea.    History provided by patient      I have reviewed the nursing notes.  I have reviewed allergies, medication list, problem list, and past medical history.    REVIEW OF SYSTEMS  Review of Systems   Constitutional:  Negative for chills and fever.   HENT:  Positive for congestion, ear pain and sinus pressure. Negative for sore throat.    Respiratory:  Positive for cough. Negative for shortness of breath and wheezing.    Cardiovascular: Negative.    Gastrointestinal:  Negative for diarrhea, nausea and vomiting.   Genitourinary: Negative.    Skin:  Negative for rash.        VITAL SIGNS  Vitals:    06/03/25 1104   BP: 122/72   Pulse: 84   Resp: 18   Temp: 97.3  F (36.3  C)   SpO2: 98%   Weight: 71.2 kg (157 lb)      Body mass index is 23.87 kg/m .      OBJECTIVE  PHYSICAL EXAM  Physical Exam  Vitals and nursing note reviewed.   Constitutional:       General: She is not in acute distress.     Appearance: Normal appearance. She is normal weight. She is not ill-appearing, toxic-appearing or diaphoretic.   HENT:      Head: Normocephalic and atraumatic.      Right Ear: Decreased hearing noted. Tenderness present. There is no impacted cerumen. Tympanic membrane is erythematous and bulging. Tympanic membrane is not perforated.      Left Ear: Tympanic membrane, ear canal and external ear normal. There is no impacted cerumen. Tympanic membrane is not perforated, erythematous or bulging.      Nose: Congestion present. No rhinorrhea.      Mouth/Throat:      Mouth: Mucous membranes are moist.      Pharynx:  Oropharynx is clear. No oropharyngeal exudate or posterior oropharyngeal erythema.   Eyes:      Extraocular Movements: Extraocular movements intact.      Conjunctiva/sclera: Conjunctivae normal.      Pupils: Pupils are equal, round, and reactive to light.   Cardiovascular:      Rate and Rhythm: Normal rate and regular rhythm.      Pulses: Normal pulses.      Heart sounds: Normal heart sounds.   Pulmonary:      Effort: Pulmonary effort is normal. No respiratory distress.      Breath sounds: Normal breath sounds. No wheezing or rhonchi.   Musculoskeletal:      Cervical back: Normal range of motion. No rigidity or tenderness.   Lymphadenopathy:      Cervical: No cervical adenopathy.   Neurological:      Mental Status: She is alert.            DIAGNOSTICS  No results found for any visits on 06/03/25.

## 2025-06-05 ENCOUNTER — THERAPY VISIT (OUTPATIENT)
Dept: PHYSICAL THERAPY | Facility: OTHER | Age: 45
End: 2025-06-05
Attending: FAMILY MEDICINE
Payer: COMMERCIAL

## 2025-06-05 DIAGNOSIS — R20.0 LEFT ARM NUMBNESS: Primary | ICD-10-CM

## 2025-06-05 DIAGNOSIS — R20.0 BILATERAL HAND NUMBNESS: ICD-10-CM

## 2025-06-05 DIAGNOSIS — R94.131 ABNORMAL ELECTROMYOGRAM (EMG): ICD-10-CM

## 2025-06-05 PROCEDURE — 97140 MANUAL THERAPY 1/> REGIONS: CPT | Mod: GP

## 2025-06-05 PROCEDURE — 97112 NEUROMUSCULAR REEDUCATION: CPT | Mod: GP

## 2025-06-09 ENCOUNTER — MYC MEDICAL ADVICE (OUTPATIENT)
Dept: NEUROLOGY | Facility: CLINIC | Age: 45
End: 2025-06-09
Payer: COMMERCIAL

## 2025-06-19 ENCOUNTER — HOSPITAL ENCOUNTER (OUTPATIENT)
Dept: CARDIOLOGY | Facility: OTHER | Age: 45
End: 2025-06-19
Attending: FAMILY MEDICINE
Payer: COMMERCIAL

## 2025-06-19 PROCEDURE — 93306 TTE W/DOPPLER COMPLETE: CPT

## 2025-06-21 ENCOUNTER — HOSPITAL ENCOUNTER (EMERGENCY)
Facility: HOSPITAL | Age: 45
Discharge: HOME | End: 2025-06-21
Payer: COMMERCIAL

## 2025-06-21 VITALS
TEMPERATURE: 96.9 F | WEIGHT: 159 LBS | HEART RATE: 79 BPM | RESPIRATION RATE: 16 BRPM | SYSTOLIC BLOOD PRESSURE: 116 MMHG | DIASTOLIC BLOOD PRESSURE: 77 MMHG | OXYGEN SATURATION: 99 % | BODY MASS INDEX: 24.18 KG/M2

## 2025-06-21 DIAGNOSIS — J34.89 SINUS PRESSURE: ICD-10-CM

## 2025-06-21 DIAGNOSIS — H65.93 MIDDLE EAR EFFUSION, BILATERAL: ICD-10-CM

## 2025-06-21 PROCEDURE — G0463 HOSPITAL OUTPT CLINIC VISIT: HCPCS

## 2025-06-21 PROCEDURE — 99213 OFFICE O/P EST LOW 20 MIN: CPT

## 2025-06-21 RX ORDER — PREDNISONE 20 MG/1
TABLET ORAL
Qty: 10 TABLET | Refills: 0 | Status: SHIPPED | OUTPATIENT
Start: 2025-06-21

## 2025-06-21 ASSESSMENT — ENCOUNTER SYMPTOMS
SINUS PAIN: 1
ACTIVITY CHANGE: 0
APPETITE CHANGE: 0
SORE THROAT: 0
SINUS PRESSURE: 1
FEVER: 0
SHORTNESS OF BREATH: 0

## 2025-06-21 ASSESSMENT — COLUMBIA-SUICIDE SEVERITY RATING SCALE - C-SSRS
6. HAVE YOU EVER DONE ANYTHING, STARTED TO DO ANYTHING, OR PREPARED TO DO ANYTHING TO END YOUR LIFE?: NO
2. HAVE YOU ACTUALLY HAD ANY THOUGHTS OF KILLING YOURSELF IN THE PAST MONTH?: NO
1. IN THE PAST MONTH, HAVE YOU WISHED YOU WERE DEAD OR WISHED YOU COULD GO TO SLEEP AND NOT WAKE UP?: NO

## 2025-06-21 ASSESSMENT — ACTIVITIES OF DAILY LIVING (ADL): ADLS_ACUITY_SCORE: 41

## 2025-06-21 NOTE — DISCHARGE INSTRUCTIONS
Prednisone once daily for 5 days. Avoid ibuprofen while taking. Take this earlier in the day as it may interfere with sleep.   Flonase nasal spray one spray to each nostril once daily for 7 days.   You can consider switching from Claritin to Allegra.     Follow up in the clinic as needed.  Return with any new or concerning symptoms.

## 2025-06-21 NOTE — ED TRIAGE NOTES
PT presents with nasal congestion and ear fullness x 3 days. PT recently had ear infection and treated. Sudden onset again of illness. PT denied fever, throat pain, n/v and diarrhea. Pt has been taking mucinex and Claritin.

## 2025-06-21 NOTE — ED PROVIDER NOTES
History     Chief Complaint   Patient presents with    Nasal Congestion     HPI  Savanna Scott is a 44 year old female who presents to the urgent care with complaints of ear pressure since being treated for a right otitis media on 6/3. Improvement in pain with cefdinir and claritin. She has since developed sinus pressure over the last 2-3 days. No fevers, sore throat, or cough. Non smoker. No hx of DM.     Allergies:  Allergies   Allergen Reactions    Insulin Aspart (Human Analog) Rash     Allergy to suspension not medication    Sulfa Antibiotics Rash     RASH    Amoxicillin-Pot Clavulanate Diarrhea    Insulin Lispro Unknown     Allergy to the suspension not the medication       Problem List:    Patient Active Problem List    Diagnosis Date Noted    Left arm numbness 12/12/2024     Priority: Medium    Bilateral hand numbness 12/12/2024     Priority: Medium    Abnormal electromyogram (EMG) 12/12/2024     Priority: Medium    Rheumatoid factor positive 09/16/2024     Priority: Medium    Dry eye syndrome of both eyes 03/04/2021     Priority: Medium    Myopia of both eyes with regular astigmatism 03/04/2021     Priority: Medium    Vitreous floaters of both eyes 03/04/2021     Priority: Medium    Anxiety 06/24/2020     Priority: Medium    Complicated migraine 01/14/2020     Priority: Medium    Gastroesophageal reflux disease without esophagitis 01/14/2020     Priority: Medium    Seasonal allergic rhinitis due to pollen 05/29/2019     Priority: Medium    Moderate episode of recurrent major depressive disorder (H) 04/29/2019     Priority: Medium    Bee sting allergy 07/15/2014     Priority: Medium        Past Medical History:    Past Medical History:   Diagnosis Date    Anxiety     Classic migraine with aura     Elevated rheumatoid factor 10/2015    Fatigue     Gastroesophageal reflux disease with esophagitis     Gestational diabetes mellitus, antepartum 02/23/2018    Hypermobility of joint     Seasonal allergic  rhinitis     Vitamin D deficiency 08/19/2014    Vocal cord paralysis 05/25/2018       Past Surgical History:    Past Surgical History:   Procedure Laterality Date    COLONOSCOPY  01/05/2010    EGD  02/15/2010    with pill cam/patency placement    LAPAROSCOPY DIAGNOSTIC (GENERAL)      UPPER GI ENDOSCOPY  05/11/2021    with biospy, throat    wisdom teeth extraction         Family History:    Family History   Problem Relation Age of Onset    Hypertension Mother     Hyperlipidemia Mother     Osteoporosis Mother     Other - See Comments Mother         Familial hypocalcuric hypercalcemia    Hypothyroidism Mother     Migraines Mother     Rheumatic fever Father     No Known Problems Brother     Heart Disease Maternal Grandmother     Myocardial Infarction Maternal Grandmother     Glaucoma Maternal Grandfather     Prostate Cancer Maternal Grandfather     Lung Cancer Paternal Grandmother     Cancer Paternal Grandfather         Bladder    Diabetes Paternal Grandfather     Glaucoma Paternal Grandfather     No Known Problems Son     Cervical Cancer Maternal Aunt     Rheumatoid Arthritis Maternal Aunt        Social History:  Marital Status:   [2]  Social History     Tobacco Use    Smoking status: Never     Passive exposure: Current    Smokeless tobacco: Never   Vaping Use    Vaping status: Never Used   Substance Use Topics    Alcohol use: Yes     Comment: Rarely    Drug use: Never        Medications:    Multiple Vitamin (MULTIVITAMIN ADULT PO)  naproxen sodium (ANAPROX) 220 MG tablet  predniSONE (DELTASONE) 20 MG tablet  venlafaxine (EFFEXOR XR) 75 MG 24 hr capsule  cholecalciferol 50 MCG (2000 UT) CAPS          Review of Systems   Constitutional:  Negative for activity change, appetite change and fever.   HENT:  Positive for congestion, ear pain, sinus pressure and sinus pain. Negative for sore throat.    Respiratory:  Negative for shortness of breath.    All other systems reviewed and are negative.      Physical Exam    BP: 116/77  Pulse: 79  Temp: 96.9  F (36.1  C)  Resp: 16  Weight: 72.1 kg (159 lb)  SpO2: 99 %      Physical Exam  Vitals and nursing note reviewed.   Constitutional:       General: She is not in acute distress.     Appearance: Normal appearance. She is not ill-appearing or toxic-appearing.   HENT:      Head:      Jaw: No trismus.      Right Ear: A middle ear effusion is present. Tympanic membrane is not erythematous.      Left Ear: A middle ear effusion is present. Tympanic membrane is not erythematous.      Nose: Congestion present.      Right Sinus: Maxillary sinus tenderness and frontal sinus tenderness present.      Left Sinus: Maxillary sinus tenderness and frontal sinus tenderness present.      Mouth/Throat:      Mouth: Mucous membranes are moist.      Pharynx: Oropharynx is clear. Uvula midline. No oropharyngeal exudate or posterior oropharyngeal erythema.   Cardiovascular:      Rate and Rhythm: Normal rate and regular rhythm.      Heart sounds: Normal heart sounds. No murmur heard.  Pulmonary:      Effort: Pulmonary effort is normal.      Breath sounds: Normal breath sounds. No wheezing, rhonchi or rales.   Neurological:      Mental Status: She is alert.         ED Course        Procedures       No results found for this or any previous visit (from the past 24 hours).    Medications - No data to display    Assessments & Plan (with Medical Decision Making)     I have reviewed the nursing notes.    I have reviewed the findings, diagnosis, plan and need for follow up with the patient.  Savanna Scott is a 44 year old female who presents to the urgent care with complaints of ear pressure since being treated for a right otitis media on 6/3. Improvement in pain with cefdinir and claritin. She has since developed sinus pressure over the last 2-3 days. No fevers, sore throat, or cough. Non smoker. No hx of DM.     MDM: vital signs normal, afebrile. Non toxic in appearance with no noted distress. Lungs clear,  heart tones regular. Non purulent effusions bilaterally to TMs, worse on right. No erythema to TM. Will treat with prednisone burst and flonase. Discussed viral, allergic, and bacterial etiologies. Sinus pressure for the last 2-3 days most likely not bacterial at this time. Abx not prescribed. Supportive measures discussed. Encouraged close follow up. She is in agreement with plan.     (H65.93) Middle ear effusion, bilateral, (J34.89) Sinus pressure  Plan: Prednisone once daily for 5 days. Avoid ibuprofen while taking. Take this earlier in the day as it may interfere with sleep.   Flonase nasal spray one spray to each nostril once daily for 7 days.   You can consider switching from Claritin to Allegra.     Follow up in the clinic as needed.  Return with any new or concerning symptoms. Understanding verbalized.     Discharge Medication List as of 6/21/2025 11:08 AM          Final diagnoses:   Middle ear effusion, bilateral   Sinus pressure       6/21/2025   [facility]       Connie Siegel NP  06/21/25 1984

## 2025-06-23 ENCOUNTER — MYC MEDICAL ADVICE (OUTPATIENT)
Dept: FAMILY MEDICINE | Facility: OTHER | Age: 45
End: 2025-06-23
Payer: COMMERCIAL

## 2025-06-30 ENCOUNTER — RESULTS FOLLOW-UP (OUTPATIENT)
Dept: FAMILY MEDICINE | Facility: OTHER | Age: 45
End: 2025-06-30

## 2025-07-01 ENCOUNTER — TELEPHONE (OUTPATIENT)
Dept: NEUROLOGY | Facility: CLINIC | Age: 45
End: 2025-07-01
Payer: COMMERCIAL

## 2025-07-01 NOTE — TELEPHONE ENCOUNTER
Dr. Thakur - would you be ok with appointment being made virtual? Or do you prefer them to be seen in clinic.    Valerie WIGGINS RN  [facility]

## 2025-07-01 NOTE — TELEPHONE ENCOUNTER
[facility] Call Center    Phone Message    May a detailed message be left on voicemail: yes     Reason for Call: Other:       Pt requesting call back to discuss if appointment with Dr. Thakur on 07/14/2025 at 10:45am can be made a virtual appointment vs in-person.     Pt's call back # [phone]    Action Taken: Message routed to:  Other: [facility]    Travel Screening: Not Applicable

## 2025-07-01 NOTE — TELEPHONE ENCOUNTER
Returned call to Savanna. Informed her that writer changed her appointment on 7/16 to virtual per her request. No other questions or concerns at this time.    Valerie WIGGINS RN  [facility]

## 2025-07-07 NOTE — PROGRESS NOTES
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  MRI scan showed left C5-C6 neuroforaminal stenosis and EMG confirmed left C6 radiculopathy.  I recommended epidural injection but she was nervous and was treated with prednisone and also went through physical therapy to include neck traction and has noticed improvement.  I have recommended:    1.  Continue home traction  2.  Follow-up as scheduled    Bilateral feet paresthesias  Patient has started noticing left greater than right bilateral leg paresthesias that have slowly ascended up to her shin.  At least 1 episode of left leg numbness was associated with her migraine.  However due to bilateral leg numbness she is concerned about the possibility of peripheral neuropathy.  I have recommended:    1.  EMG bilateral lower extremity  2.  If EMG confirms a diagnosis of peripheral neuropathy, I will schedule her for additional lab work for common causes of neuropathy  3.  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  She was started on Effexor and currently takes 75 mg daily and gets 1 episode every other month.  At present I have recommended continuing with Effexor 75 mg daily    Thank you  again for this referral, please feel free to contact me if you have any questions.    Frank Thakur MD  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  Her MRI scan showed left C5-C6 neuroforaminal stenosis and EMG confirmed left C6 radiculopathy.  She was referred to physical therapy to include neck traction and left C5-C6 epidural.  However she was nervous about epidural and was given oral prednisone 50 mg tapering it down in 10 days.  She had called as she was having increased numbness mostly of the left lower extremity and her primary care physician had recommended an EMG of the left lower extremity.  She was hoping to get EMG and follow-up as the same day but after it was not possible visit was switched to virtual visit.  According to patient she has noticed improvement in her neck symptoms with prednisone and also neck traction.  She still has some numbness and tingling in the both.  She is also reporting her left greater than right leg numbness that started in the bottom of the feet and has ascended up to her shin more so on the left side.  She denies any autonomic symptoms.  She is wondering if she has peripheral neuropathy.  At least on 1 occasion numbness in the left leg was associated with her migraine headache    She also has history of complicated migraine that started more than 15 to 20 years ago and at times are associated with word finding difficulty, slurred speech and dizziness.  She was started on Effexor and currently takes 75 mg daily and gets 1 episode every other month.  At least on 1 occasion she had migraine headache associated with left leg numbness     PROBLEM LIST   Patient Active Problem List   Diagnosis    Anxiety    Bee sting allergy    Complicated migraine    Dry  eye syndrome of both eyes    Gastroesophageal reflux disease without esophagitis    Moderate episode of recurrent major depressive disorder (H)    Myopia of both eyes with regular astigmatism    Seasonal allergic rhinitis due to pollen    Vitreous floaters of both eyes    Rheumatoid factor positive    Left arm numbness    Bilateral hand numbness    Abnormal electromyogram (EMG)         PAST MEDICAL & SURGICAL HISTORY     Past Medical History:   Patient  has a past medical history of Anxiety, Classic migraine with aura, Elevated rheumatoid factor (10/2015), Fatigue, Gastroesophageal reflux disease with esophagitis, Gestational diabetes mellitus, antepartum (02/23/2018), Hypermobility of joint, Seasonal allergic rhinitis, Vitamin D deficiency (08/19/2014), and Vocal cord paralysis (05/25/2018).    Surgical History:  She  has a past surgical history that includes Egd (02/15/2010); Colonoscopy (01/05/2010); Laparoscopy diagnostic (general); wisdom teeth extraction; and upper gi endoscopy (05/11/2021).     SOCIAL HISTORY     Reviewed, and she  reports that she has never smoked. She has been exposed to tobacco smoke. She has never used smokeless tobacco. She reports current alcohol use. She reports that she does not use drugs.     FAMILY HISTORY     Reviewed, and family history includes Cancer in her paternal grandfather; Cervical Cancer in her maternal aunt; Diabetes in her paternal grandfather; Glaucoma in her maternal grandfather and paternal grandfather; Heart Disease in her maternal grandmother; Hyperlipidemia in her mother; Hypertension in her mother; Hypothyroidism in her mother; Lung Cancer in her paternal grandmother; Migraines in her mother; Myocardial Infarction in her maternal grandmother; No Known Problems in her brother and son; Osteoporosis in her mother; Other - See Comments in her mother; Prostate Cancer in her maternal grandfather; Rheumatic fever in her father; Rheumatoid Arthritis in her maternal aunt.  "    ALLERGIES     Allergies   Allergen Reactions    Insulin Aspart (Human Analog) Rash     Allergy to suspension not medication    Sulfa Antibiotics Rash     RASH    Amoxicillin-Pot Clavulanate Diarrhea    Insulin Lispro Unknown     Allergy to the suspension not the medication         REVIEW OF SYSTEMS     A 12 point review of system was performed and was negative except as outlined in the history of present illness.     HOME MEDICATIONS     Current Outpatient Rx   Medication Sig Dispense Refill    cholecalciferol 50 MCG (2000 UT) CAPS Take by mouth once daily.      Multiple Vitamin (MULTIVITAMIN ADULT PO) Take by mouth once daily.      naproxen sodium (ANAPROX) 220 MG tablet by mouth. Takes one to two tabs as needed      predniSONE (DELTASONE) 20 MG tablet Take two tablets (= 40mg) each day for 5 (five) days 10 tablet 0    venlafaxine (EFFEXOR XR) 75 MG 24 hr capsule Take 1 capsule (75 mg) by mouth daily. 90 capsule 3         PHYSICAL EXAM     Vital signs  Ht 1.727 m (5' 8\")   Wt 72.1 kg (159 lb)   LMP 05/18/2025 (Exact Date)   BMI 24.18 kg/m      Weight:   159 lbs 0 oz    Patient is alert and oriented x 3.  Speech is normal, fluent with intact comprehension and repetition no aphasia.  Cognition is intact.  Face symmetrical extraocular movements are intact.  Gross motor strength appears symmetric.  Patient reports normal sensation.  Normal finger-nose testing.    The virtual neurological exam was within normal limits for mental status, cranial nerves, motor function, sensory function, coordination and gait.  No acute neurological deficits were identified during this video visit.  However limitations of the virtual exam including ability to assess reflexes, funduscopic exam, detailed sensory testing and fine motor strength.       PERTINENT DIAGNOSTIC STUDIES     Following studies were reviewed:     XR CERVICAL SPINE 9/19/2024  No acute fracture or subluxation.     MRI THORACIC SPINE 9/24/2024  Negative study. No " signal abnormality/enhancement is  present to suggest a demyelinating process.     MRI BRAIN 8/1/2024  1. No acute intracranial abnormality.  2. Mild white matter changes, nonspecific, but can be seen in the  setting of chronic migraine headaches, prior infectious or  inflammatory insults, vasculopathy, or small vessel ischemic disease  or demyelination.  3. Subtle ill-defined linear focus of enhancement in the anterior  right frontal lobe, likely benign developmental venous anomaly.  Consider 6 month follow-up.     MRI CERVICAL SPINE 8/1/2024  No suspicious enhancement in the cervical spinal cord, thecal sac or  vertebrae.     Mild left foraminal narrowing at C5-6 secondary to a foraminal disc  protrusion, otherwise no significant spinal canal or foraminal  narrowing.     MRI BRAIN 7/22/2021  1.  No acute intracranial abnormality. No evidence of acute infarct,   hemorrhage, or mass.      EMG 1/14/2025  This is a abnormal nerve conduction and EMG study of left upper extremity that suggests left C6 radiculopathy.      PERTINENT LABS  Following labs were reviewed:  Office Visit on 06/06/2025   Component Date Value Ref Range Status    LVEF  06/19/2025 60-65%   Final         Total time spent for face to face visit, reviewing labs/imaging studies, counseling and coordination of care was: 30 Minutes spent on the date of the encounter doing chart review, review of outside records, review of test results, interpretation of tests, patient visit, and documentation     The longitudinal plan of care for the diagnosis(es)/condition(s) as documented were addressed during this visit. Due to the added complexity in care, I will continue to support Savanna in the subsequent management and with ongoing continuity of care.    This note was dictated using voice recognition software.  Any grammatical or context distortions are unintentional and inherent to the software.    Orders Placed This Encounter   Procedures    EMG      New  Prescriptions    No medications on file     Modified Medications    No medications on file

## 2025-07-14 ENCOUNTER — VIRTUAL VISIT (OUTPATIENT)
Dept: NEUROLOGY | Facility: CLINIC | Age: 45
End: 2025-07-14
Payer: COMMERCIAL

## 2025-07-14 VITALS — BODY MASS INDEX: 24.1 KG/M2 | WEIGHT: 159 LBS | HEIGHT: 68 IN

## 2025-07-14 DIAGNOSIS — R20.2 PARESTHESIA: ICD-10-CM

## 2025-07-14 DIAGNOSIS — G43.109 COMPLICATED MIGRAINE: ICD-10-CM

## 2025-07-14 DIAGNOSIS — M54.12 C6 RADICULOPATHY: Primary | ICD-10-CM

## 2025-07-14 PROCEDURE — 1126F AMNT PAIN NOTED NONE PRSNT: CPT | Performed by: PSYCHIATRY & NEUROLOGY

## 2025-07-14 PROCEDURE — 98006 SYNCH AUDIO-VIDEO EST MOD 30: CPT | Performed by: PSYCHIATRY & NEUROLOGY

## 2025-07-14 ASSESSMENT — PAIN SCALES - GENERAL: PAINLEVEL_OUTOF10: NO PAIN (0)

## 2025-07-14 NOTE — PROGRESS NOTES
Virtual Visit Details    Type of service:  Video Visit     Originating Location (pt. Location): Home    Distant Location (provider location):  On-site  Platform used for Video Visit: Ca

## 2025-07-14 NOTE — LETTER
2025      Savanna Scott  [address]  [facility] [address]      Dear Colleague,    Thank you for referring your patient, Savanna Scott, to the [facility] [place]. Please see a copy of my visit note below.    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  MRI scan showed left C5-C6 neuroforaminal stenosis and EMG confirmed left C6 radiculopathy.  I recommended epidural injection but she was nervous and was treated with prednisone and also went through physical therapy to include neck traction and has noticed improvement.  I have recommended:    1.  Continue home traction  2.  Follow-up as scheduled    Bilateral feet paresthesias  Patient has started noticing left greater than right bilateral leg paresthesias that have slowly ascended up to her shin.  At least 1 episode of left leg numbness was associated with her migraine.  However due to bilateral leg numbness she is concerned about the possibility of peripheral neuropathy.  I have recommended:    1.  EMG bilateral lower extremity  2.  If EMG confirms a diagnosis of peripheral neuropathy, I will schedule her for additional lab work for common causes of neuropathy  3.  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  She was started on Effexor and currently takes 75 mg daily and gets 1 episode every other month.  At present I have recommended continuing with Effexor 75 mg daily    Thank you again for this referral, please feel free to contact me if you have any questions.    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  Her MRI scan showed left C5-C6 neuroforaminal stenosis and EMG confirmed left C6 radiculopathy.  She was referred to physical therapy to include neck traction and left C5-C6 epidural.  However she was nervous about epidural and was given oral prednisone 50 mg tapering it down in 10 days.  She had called as she was having increased numbness mostly of the left lower extremity and her primary care physician had recommended an EMG of the left lower extremity.  She was hoping to get EMG and follow-up as the same day but after it was not possible visit was switched to virtual visit.  According to patient she has noticed improvement in her neck symptoms with prednisone and also neck traction.  She still has some numbness and tingling in the both.  She is also reporting her left greater than right leg numbness that started in the bottom of the feet and has ascended up to her shin more so on the left side.  She denies any autonomic symptoms.  She is wondering if she has peripheral neuropathy.  At least on 1 occasion numbness in the left leg was associated with her migraine headache    She also has history of complicated migraine that started more than 15 to 20 years ago and at times are associated with word finding difficulty, slurred speech and dizziness.  She was started on Effexor and currently takes 75 mg  daily and gets 1 episode every other month.  At least on 1 occasion she had migraine headache associated with left leg numbness     PROBLEM LIST   Patient Active Problem List   Diagnosis     Anxiety     Bee sting allergy     Complicated migraine     Dry eye syndrome of both eyes     Gastroesophageal reflux disease without esophagitis     Moderate episode of recurrent major depressive disorder (H)     Myopia of both eyes with regular astigmatism     Seasonal allergic rhinitis due to pollen     Vitreous floaters of both eyes     Rheumatoid factor positive     Left arm numbness     Bilateral hand numbness     Abnormal electromyogram (EMG)         PAST MEDICAL & SURGICAL HISTORY     Past Medical History:   Patient  has a past medical history of Anxiety, Classic migraine with aura, Elevated rheumatoid factor (10/2015), Fatigue, Gastroesophageal reflux disease with esophagitis, Gestational diabetes mellitus, antepartum (02/23/2018), Hypermobility of joint, Seasonal allergic rhinitis, Vitamin D deficiency (08/19/2014), and Vocal cord paralysis (05/25/2018).    Surgical History:  She  has a past surgical history that includes Egd (02/15/2010); Colonoscopy (01/05/2010); Laparoscopy diagnostic (general); wisdom teeth extraction; and upper gi endoscopy (05/11/2021).     SOCIAL HISTORY     Reviewed, and she  reports that she has never smoked. She has been exposed to tobacco smoke. She has never used smokeless tobacco. She reports current alcohol use. She reports that she does not use drugs.     FAMILY HISTORY     Reviewed, and family history includes Cancer in her paternal grandfather; Cervical Cancer in her maternal aunt; Diabetes in her paternal grandfather; Glaucoma in her maternal grandfather and paternal grandfather; Heart Disease in her maternal grandmother; Hyperlipidemia in her mother; Hypertension in her mother; Hypothyroidism in her mother; Lung Cancer in her paternal grandmother; Migraines in her mother; Myocardial  "Infarction in her maternal grandmother; No Known Problems in her brother and son; Osteoporosis in her mother; Other - See Comments in her mother; Prostate Cancer in her maternal grandfather; Rheumatic fever in her father; Rheumatoid Arthritis in her maternal aunt.     ALLERGIES     Allergies   Allergen Reactions     Insulin Aspart (Human Analog) Rash     Allergy to suspension not medication     Sulfa Antibiotics Rash     RASH     Amoxicillin-Pot Clavulanate Diarrhea     Insulin Lispro Unknown     Allergy to the suspension not the medication         REVIEW OF SYSTEMS     A 12 point review of system was performed and was negative except as outlined in the history of present illness.     HOME MEDICATIONS     Current Outpatient Rx   Medication Sig Dispense Refill     cholecalciferol 50 MCG (2000 UT) CAPS Take by mouth once daily.       Multiple Vitamin (MULTIVITAMIN ADULT PO) Take by mouth once daily.       naproxen sodium (ANAPROX) 220 MG tablet by mouth. Takes one to two tabs as needed       predniSONE (DELTASONE) 20 MG tablet Take two tablets (= 40mg) each day for 5 (five) days 10 tablet 0     venlafaxine (EFFEXOR XR) 75 MG 24 hr capsule Take 1 capsule (75 mg) by mouth daily. 90 capsule 3         PHYSICAL EXAM     Vital signs  Ht 1.727 m (5' 8\")   Wt 72.1 kg (159 lb)   LMP 05/18/2025 (Exact Date)   BMI 24.18 kg/m      Weight:   159 lbs 0 oz    Patient is alert and oriented x 3.  Speech is normal, fluent with intact comprehension and repetition no aphasia.  Cognition is intact.  Face symmetrical extraocular movements are intact.  Gross motor strength appears symmetric.  Patient reports normal sensation.  Normal finger-nose testing.    The virtual neurological exam was within normal limits for mental status, cranial nerves, motor function, sensory function, coordination and gait.  No acute neurological deficits were identified during this video visit.  However limitations of the virtual exam including ability to " assess reflexes, funduscopic exam, detailed sensory testing and fine motor strength.       PERTINENT DIAGNOSTIC STUDIES     Following studies were reviewed:     XR CERVICAL SPINE 9/19/2024  No acute fracture or subluxation.     MRI THORACIC SPINE 9/24/2024  Negative study. No signal abnormality/enhancement is  present to suggest a demyelinating process.     MRI BRAIN 8/1/2024  1. No acute intracranial abnormality.  2. Mild white matter changes, nonspecific, but can be seen in the  setting of chronic migraine headaches, prior infectious or  inflammatory insults, vasculopathy, or small vessel ischemic disease  or demyelination.  3. Subtle ill-defined linear focus of enhancement in the anterior  right frontal lobe, likely benign developmental venous anomaly.  Consider 6 month follow-up.     MRI CERVICAL SPINE 8/1/2024  No suspicious enhancement in the cervical spinal cord, thecal sac or  vertebrae.     Mild left foraminal narrowing at C5-6 secondary to a foraminal disc  protrusion, otherwise no significant spinal canal or foraminal  narrowing.     MRI BRAIN 7/22/2021  1.  No acute intracranial abnormality. No evidence of acute infarct,   hemorrhage, or mass.      EMG 1/14/2025  This is a abnormal nerve conduction and EMG study of left upper extremity that suggests left C6 radiculopathy.      PERTINENT LABS  Following labs were reviewed:  Office Visit on 06/06/2025   Component Date Value Ref Range Status     LVEF  06/19/2025 60-65%   Final         Total time spent for face to face visit, reviewing labs/imaging studies, counseling and coordination of care was: 30 Minutes spent on the date of the encounter doing chart review, review of outside records, review of test results, interpretation of tests, patient visit, and documentation     The longitudinal plan of care for the diagnosis(es)/condition(s) as documented were addressed during this visit. Due to the added complexity in care, I will continue to support Savanna in  the subsequent management and with ongoing continuity of care.    This note was dictated using voice recognition software.  Any grammatical or context distortions are unintentional and inherent to the software.    Orders Placed This Encounter   Procedures     EMG      New Prescriptions    No medications on file     Modified Medications    No medications on file                 Virtual Visit Details    Type of service:  Video Visit     Originating Location (pt. Location): Home    Distant Location (provider location):  On-site  Platform used for Video Visit: AmWell      Again, thank you for allowing me to participate in the care of your patient.        Sincerely,        Frank Thakur MD    Electronically signed

## 2025-07-18 ENCOUNTER — HOSPITAL ENCOUNTER (OUTPATIENT)
Dept: MAMMOGRAPHY | Facility: OTHER | Age: 45
Discharge: HOME | End: 2025-07-18
Attending: FAMILY MEDICINE | Admitting: FAMILY MEDICINE
Payer: COMMERCIAL

## 2025-07-18 DIAGNOSIS — Z12.31 VISIT FOR SCREENING MAMMOGRAM: ICD-10-CM

## 2025-07-18 DIAGNOSIS — Z00.00 HEALTH CARE MAINTENANCE: ICD-10-CM

## 2025-07-18 PROCEDURE — 77063 BREAST TOMOSYNTHESIS BI: CPT | Mod: 26 | Performed by: RADIOLOGY

## 2025-07-18 PROCEDURE — 77063 BREAST TOMOSYNTHESIS BI: CPT

## 2025-07-18 PROCEDURE — 77067 SCR MAMMO BI INCL CAD: CPT | Mod: 26 | Performed by: RADIOLOGY

## 2025-08-03 ENCOUNTER — HEALTH MAINTENANCE LETTER (OUTPATIENT)
Age: 45
End: 2025-08-03

## (undated) RX ORDER — LIDOCAINE HCL/EPINEPHRINE/PF 2%-1:200K
VIAL (ML) INJECTION
Status: DISPENSED
Start: 2025-03-05